# Patient Record
Sex: FEMALE | Race: WHITE | Employment: UNEMPLOYED | URBAN - METROPOLITAN AREA
[De-identification: names, ages, dates, MRNs, and addresses within clinical notes are randomized per-mention and may not be internally consistent; named-entity substitution may affect disease eponyms.]

---

## 2017-02-03 ENCOUNTER — TELEPHONE (OUTPATIENT)
Dept: FAMILY MEDICINE CLINIC | Facility: CLINIC | Age: 52
End: 2017-02-03

## 2017-02-03 RX ORDER — DIAZEPAM 5 MG/1
5 TABLET ORAL EVERY MORNING
Qty: 30 TABLET | Refills: 6 | Status: SHIPPED
Start: 2017-02-03 | End: 2017-02-08 | Stop reason: ALTCHOICE

## 2017-02-08 NOTE — TELEPHONE ENCOUNTER
Called patient and spoke with her. She states that given her recent family incident she is requesting to switch her Valium back to Xanax ER. Patient states that she is using Valium 5mg during the day and Valium 10mg at night for sleep.   She states that t

## 2017-02-08 NOTE — TELEPHONE ENCOUNTER
Rx called into pharmacy for patient. Called patient and left message advising of this information. Advised patient to contact the office with any questions.

## 2017-02-22 RX ORDER — DIAZEPAM 10 MG/1
10 TABLET ORAL NIGHTLY PRN
Qty: 30 TABLET | Refills: 1 | Status: SHIPPED
Start: 2017-02-22 | End: 2017-04-17

## 2017-04-17 ENCOUNTER — OFFICE VISIT (OUTPATIENT)
Dept: FAMILY MEDICINE CLINIC | Facility: CLINIC | Age: 52
End: 2017-04-17

## 2017-04-17 VITALS
TEMPERATURE: 98 F | SYSTOLIC BLOOD PRESSURE: 118 MMHG | WEIGHT: 136 LBS | DIASTOLIC BLOOD PRESSURE: 72 MMHG | HEART RATE: 96 BPM | RESPIRATION RATE: 16 BRPM | BODY MASS INDEX: 27 KG/M2

## 2017-04-17 DIAGNOSIS — Z13.228 SCREENING FOR ENDOCRINE, NUTRITIONAL, METABOLIC AND IMMUNITY DISORDER: Primary | ICD-10-CM

## 2017-04-17 DIAGNOSIS — Z12.11 SCREEN FOR COLON CANCER: ICD-10-CM

## 2017-04-17 DIAGNOSIS — Z13.0 SCREENING FOR ENDOCRINE, NUTRITIONAL, METABOLIC AND IMMUNITY DISORDER: Primary | ICD-10-CM

## 2017-04-17 DIAGNOSIS — F41.9 ANXIETY: ICD-10-CM

## 2017-04-17 DIAGNOSIS — Z13.29 SCREENING FOR ENDOCRINE, NUTRITIONAL, METABOLIC AND IMMUNITY DISORDER: Primary | ICD-10-CM

## 2017-04-17 DIAGNOSIS — R10.32 LLQ ABDOMINAL PAIN: ICD-10-CM

## 2017-04-17 DIAGNOSIS — Z12.31 ENCOUNTER FOR SCREENING MAMMOGRAM FOR MALIGNANT NEOPLASM OF BREAST: ICD-10-CM

## 2017-04-17 DIAGNOSIS — F32.0 MILD SINGLE CURRENT EPISODE OF MAJOR DEPRESSIVE DISORDER (HCC): ICD-10-CM

## 2017-04-17 DIAGNOSIS — Z13.21 SCREENING FOR ENDOCRINE, NUTRITIONAL, METABOLIC AND IMMUNITY DISORDER: Primary | ICD-10-CM

## 2017-04-17 PROCEDURE — 99214 OFFICE O/P EST MOD 30 MIN: CPT | Performed by: FAMILY MEDICINE

## 2017-04-17 RX ORDER — DIAZEPAM 10 MG/1
10 TABLET ORAL NIGHTLY PRN
Qty: 30 TABLET | Refills: 1 | Status: SHIPPED
Start: 2017-04-17 | End: 2017-05-15

## 2017-04-17 RX ORDER — ESOMEPRAZOLE MAGNESIUM 20 MG/1
20 FOR SUSPENSION ORAL
Qty: 30 EACH | Refills: 0 | COMMUNITY
Start: 2017-04-17 | End: 2017-10-10

## 2017-04-17 RX ORDER — ESOMEPRAZOLE MAGNESIUM 40 MG/1
40 FOR SUSPENSION ORAL
Qty: 12 EACH | Refills: 0 | Status: CANCELLED | COMMUNITY
Start: 2017-04-17

## 2017-04-17 NOTE — PROGRESS NOTES
Dwayne Coyne is a 45year old female. Patient presents with:  Medication Follow-Up      HPI:   Pt here to with a new complain of depressed mood.  Pt feels down, no energy, sensee of failure, crying, loss of interest. This problem last one month, pt has Used                        Alcohol Use: Yes                Comment: OCC       REVIEW OF SYSTEMS:   GENERAL HEALTH: feels well otherwise  RESPIRATORY: denies shortness of breath with exertion  CARDIOVASCULAR: denies chest pain on exertion  GI: denies abdom this visit:    Screening for endocrine, nutritional, metabolic and immunity disorder  -     CBC With Diff; Future  -     CMP; Future  -     Lipid;  Future  -     TSH W Reflex To Free T4; Future    Encounter for screening mammogram for malignant neoplasm of

## 2017-04-21 ENCOUNTER — HOSPITAL ENCOUNTER (OUTPATIENT)
Dept: CT IMAGING | Age: 52
Discharge: HOME | End: 2017-04-21
Attending: FAMILY MEDICINE
Payer: COMMERCIAL

## 2017-04-21 DIAGNOSIS — R10.32 LLQ ABDOMINAL PAIN: ICD-10-CM

## 2017-04-21 PROCEDURE — 74177 CT ABD & PELVIS W/CONTRAST: CPT

## 2017-04-24 ENCOUNTER — TELEPHONE (OUTPATIENT)
Dept: FAMILY MEDICINE CLINIC | Facility: CLINIC | Age: 52
End: 2017-04-24

## 2017-04-24 DIAGNOSIS — N93.9 VAGINAL BLEEDING: ICD-10-CM

## 2017-04-24 DIAGNOSIS — N95.1 MENOPAUSE SYNDROME: Primary | ICD-10-CM

## 2017-04-24 RX ORDER — METRONIDAZOLE 500 MG/1
500 TABLET ORAL 3 TIMES DAILY
Qty: 21 TABLET | Refills: 0 | Status: SHIPPED | OUTPATIENT
Start: 2017-04-24 | End: 2017-05-01

## 2017-04-24 RX ORDER — CIPROFLOXACIN 500 MG/1
500 TABLET, FILM COATED ORAL 2 TIMES DAILY
Qty: 14 TABLET | Refills: 0 | Status: SHIPPED | OUTPATIENT
Start: 2017-04-24 | End: 2017-05-01

## 2017-04-24 NOTE — TELEPHONE ENCOUNTER
Pt is calling back with questions. Pt states the medications that were ordered were not discussed with her. Pls call.

## 2017-04-24 NOTE — TELEPHONE ENCOUNTER
I spoke to pt. She has been having pain over her right ovary, bloating and pressure. Pt states she has bleeding when she urinates.   When I asked if its possible that its vaginal bleeding, pt states she doesn't know, just states she sees blood when she wi

## 2017-04-24 NOTE — TELEPHONE ENCOUNTER
Pt states she now feels this is not from a UTI, its vaginally. She states she has vaginal bleeding after sex often. Pt refuses to come in for urine check. Discussed that we don't want to miss a UTI, plus she has kidney stones per report.   Pt declines OV

## 2017-04-24 NOTE — TELEPHONE ENCOUNTER
Pt needs to be seen for bleeding during urination as patient did not discuss this with Dr. Kirill Baker that I can see from her note. I am going to treat her for colitis with cipro and flagyl. Please have her f/u with Dr. Kirill Baker or I if no improvement after antibiotic.

## 2017-04-24 NOTE — TELEPHONE ENCOUNTER
Ovarian cysts usually do not cause vaginal bleeding. That is usually more of a uterine problem like fibroids, thickened lining, etc.. This is not explained by the CT findings. We need to do pelvic US to assess this.  Dr. Juan Pablo Guadalupe will likely do an endometria

## 2017-04-24 NOTE — TELEPHONE ENCOUNTER
----- Message from Frankie Linares [place] sent at 4/24/2017 12:24 PM CDT -----  Patient has mild transverse colon wall thickening which could be normal or d/t colitis. I did not see this patient. Please ask her symptoms for which we did the CT and let me know.

## 2017-04-26 ENCOUNTER — HOSPITAL ENCOUNTER (OUTPATIENT)
Dept: ULTRASOUND IMAGING | Age: 52
Discharge: HOME | End: 2017-04-26
Attending: PHYSICIAN ASSISTANT
Payer: COMMERCIAL

## 2017-04-26 ENCOUNTER — LAB ENCOUNTER (OUTPATIENT)
Dept: LAB | Age: 52
End: 2017-04-26
Attending: FAMILY MEDICINE
Payer: COMMERCIAL

## 2017-04-26 DIAGNOSIS — Z13.228 SCREENING FOR ENDOCRINE, NUTRITIONAL, METABOLIC AND IMMUNITY DISORDER: ICD-10-CM

## 2017-04-26 DIAGNOSIS — N95.1 MENOPAUSE SYNDROME: ICD-10-CM

## 2017-04-26 DIAGNOSIS — Z13.21 SCREENING FOR ENDOCRINE, NUTRITIONAL, METABOLIC AND IMMUNITY DISORDER: ICD-10-CM

## 2017-04-26 DIAGNOSIS — N93.9 VAGINAL BLEEDING: ICD-10-CM

## 2017-04-26 DIAGNOSIS — Z13.29 SCREENING FOR ENDOCRINE, NUTRITIONAL, METABOLIC AND IMMUNITY DISORDER: ICD-10-CM

## 2017-04-26 DIAGNOSIS — Z13.0 SCREENING FOR ENDOCRINE, NUTRITIONAL, METABOLIC AND IMMUNITY DISORDER: ICD-10-CM

## 2017-04-26 PROCEDURE — 80061 LIPID PANEL: CPT | Performed by: FAMILY MEDICINE

## 2017-04-26 PROCEDURE — 76856 US EXAM PELVIC COMPLETE: CPT

## 2017-04-26 PROCEDURE — 76830 TRANSVAGINAL US NON-OB: CPT

## 2017-04-26 PROCEDURE — 36415 COLL VENOUS BLD VENIPUNCTURE: CPT | Performed by: FAMILY MEDICINE

## 2017-04-26 PROCEDURE — 80050 GENERAL HEALTH PANEL: CPT | Performed by: FAMILY MEDICINE

## 2017-04-27 ENCOUNTER — TELEPHONE (OUTPATIENT)
Dept: FAMILY MEDICINE CLINIC | Facility: CLINIC | Age: 52
End: 2017-04-27

## 2017-04-27 NOTE — TELEPHONE ENCOUNTER
----- Message from Fatuma Bahena, [place] sent at 4/26/2017  7:48 PM CDT -----  Patient's endometrium is 9 mm which is normal if she is still menstruating but is a little thick if she is not.  If she is experiencing abnormal vaginal bleeding, then Dr. Tatiana Tam w

## 2017-04-27 NOTE — TELEPHONE ENCOUNTER
Called patient and spoke with her. Went over results and POC below. Patient states understanding. All questions answered for patient and she expressed understanding.

## 2017-04-28 ENCOUNTER — TELEPHONE (OUTPATIENT)
Dept: FAMILY MEDICINE CLINIC | Facility: CLINIC | Age: 52
End: 2017-04-28

## 2017-04-28 NOTE — TELEPHONE ENCOUNTER
Patient calling again told her someone will get back to her and she questioned since when can they leave detailed messages. The response since you authorized on your HIPPA form.

## 2017-04-28 NOTE — TELEPHONE ENCOUNTER
----- Message from Anshu Pugh MD sent at 4/27/2017  8:54 PM CDT -----  Overall good results, cont. Low lipid diet.

## 2017-05-15 NOTE — PROGRESS NOTES
Zahraa Kelly is a 45year old female. Patient presents with:  Depression: f/u       HPI:   Pt  F/u anxiety, feeling much better, no more panic attacks, mood under control, feels well,last months no heart palpitations. Mild now. No chest pains.   Patient hallucinations, no SI, no HI. Normal insight/judjment. ASSESSMENT AND PLAN:   Anxiety - no suicidal or homicidal thoughts. panic attacks. Long discussion about pathophysiology of anxiety.     Signed Prescriptions Disp Refills    diazepam (VALIUM) 10 MG

## 2017-05-18 ENCOUNTER — TELEPHONE (OUTPATIENT)
Dept: FAMILY MEDICINE CLINIC | Facility: CLINIC | Age: 52
End: 2017-05-18

## 2017-05-18 NOTE — TELEPHONE ENCOUNTER
Pt had called to give phone # to contact [employer].. Pt informed that PA form completed/faxe to [employer]. and confirmation has been recv.

## 2017-05-18 NOTE — TELEPHONE ENCOUNTER
PA form completed and faxed to [facility]. Confirmation received. Will pierre for f/u. I called patient and advised that PA was initiated. Advised sometimes this process can take up to 14 days depending on quickly her insurance will process the PA.  She

## 2017-05-22 RX ORDER — ESCITALOPRAM OXALATE 10 MG/1
10 TABLET ORAL DAILY
Qty: 30 TABLET | Refills: 3 | Status: SHIPPED | OUTPATIENT
Start: 2017-05-22 | End: 2017-05-23

## 2017-05-22 NOTE — TELEPHONE ENCOUNTER
Pt was advised by [employer] that they will not cover this medication. Pt would like Dr to prescribe something in the same family but must be generic brand. Pt is also concerned with withdrawal from Vortioxetine HBr (TRINTELLIX). Please call and advise.  She has

## 2017-05-22 NOTE — TELEPHONE ENCOUNTER
PA was faxed to the insurance already. The pt stated the insurance is not covering the medication. Pt requires a generic. Please advise. Our office currently does not have samples.

## 2017-05-23 RX ORDER — BUPROPION HYDROCHLORIDE 300 MG/1
300 TABLET ORAL DAILY
Qty: 30 TABLET | Refills: 0 | Status: SHIPPED | OUTPATIENT
Start: 2017-05-23 | End: 2017-06-23

## 2017-05-23 RX ORDER — BUPROPION HYDROCHLORIDE 150 MG/1
150 TABLET ORAL DAILY
Qty: 7 TABLET | Refills: 0 | Status: SHIPPED | OUTPATIENT
Start: 2017-05-23 | End: 2017-06-23

## 2017-05-23 NOTE — TELEPHONE ENCOUNTER
Ok for Wellbutrin XL first week 150mg a day then 300mg XL once a day. Ok to call it in. Please call Mckenzie Parker from [employer] and tell her we have problems with refills.

## 2017-05-23 NOTE — TELEPHONE ENCOUNTER
Patient has cancelled script for lexapro, she had bad reaction to it previously, hair loss, wants script for wellbutrin sent, patient has been out of medication for 3 days now and is feeling dizzy, asks that this request be expedited

## 2017-05-23 NOTE — TELEPHONE ENCOUNTER
I sent in RXs and then called pt. Pt states she made a few more calls to the insurance company and the pharmacy entered the [employer] rebate card again and is covered at $10.  I cancelled the Bupropion with the pharmacy.

## 2017-06-22 NOTE — TELEPHONE ENCOUNTER
Please see above message. Called [facility] and spoke with Falguni Sanchez. He advised that patient refilled #30 tablets on 6/14/17. Please advise. Thank you!

## 2017-06-22 NOTE — TELEPHONE ENCOUNTER
Per Dr. Jorge Luis Vyas, ok for early refill, but patient needs to be reminded that this prescription will need to last for 30 days. I left a message for her to call back to inform her of above. Spoke to Pratik De La Cruz at [facility] and advised per Dr. Jorge Luis Vyas ok for early refill.  Br

## 2017-06-23 NOTE — TELEPHONE ENCOUNTER
I spoke to patient. Advised Valium sent to pharmacy. Advised no more refills until 7/22/17. She states undersanding. She is also requesting to d/c the Trintellix and go back to trying Wellbutrin.  Wellbutrin was previously sent to her pharmacy last shirin

## 2017-06-23 NOTE — TELEPHONE ENCOUNTER
Patient notified Wellbutrin sent to pharmacy. Reviewed medication directions. Med list updated. She states understanding. No further questions.

## 2017-07-17 ENCOUNTER — TELEPHONE (OUTPATIENT)
Dept: FAMILY MEDICINE CLINIC | Facility: CLINIC | Age: 52
End: 2017-07-17

## 2017-07-17 NOTE — TELEPHONE ENCOUNTER
Called pt, she stated the Bupropion is working well for her and that she would like a refill. Pt verified she is taking  mg daily. Okay to send additional refills? Also pt stated she has issues with IBS and she has been constipated for 2 weeks.  Pt

## 2017-07-18 RX ORDER — BUPROPION HYDROCHLORIDE 300 MG/1
300 TABLET ORAL DAILY
Qty: 30 TABLET | Refills: 5 | Status: SHIPPED | OUTPATIENT
Start: 2017-07-18 | End: 2017-08-10

## 2017-08-10 ENCOUNTER — TELEPHONE (OUTPATIENT)
Dept: FAMILY MEDICINE CLINIC | Facility: CLINIC | Age: 52
End: 2017-08-10

## 2017-08-10 RX ORDER — BUPROPION HYDROCHLORIDE 300 MG/1
300 TABLET ORAL DAILY
Qty: 90 TABLET | Refills: 0 | Status: SHIPPED | OUTPATIENT
Start: 2017-08-10 | End: 2017-11-08

## 2017-08-10 NOTE — TELEPHONE ENCOUNTER
Patient states per ins her refill for BuPROPion HCl ER, XL, 300 MG Oral Tablet 24 Hr has to be 90 day refill

## 2017-09-07 ENCOUNTER — TELEPHONE (OUTPATIENT)
Dept: FAMILY MEDICINE CLINIC | Facility: CLINIC | Age: 52
End: 2017-09-07

## 2017-09-07 NOTE — TELEPHONE ENCOUNTER
Patient states she got some labs done through her spouses employment and wants someone to go through her April numbers with her, she feels her numbers are way off, please call patient

## 2017-09-08 RX ORDER — ATORVASTATIN CALCIUM 20 MG/1
20 TABLET, FILM COATED ORAL NIGHTLY
Qty: 90 TABLET | Refills: 0 | Status: SHIPPED | OUTPATIENT
Start: 2017-09-08 | End: 2017-12-06

## 2017-09-08 NOTE — TELEPHONE ENCOUNTER
Pt calling regarding Lab Yanni labs done 17. I had Dr Ct Curtis review labs and she states her TC and LDL is high. Pts mom  at age 48 something of heart disease. Pt is very upset about her numbers.  States she is in shape and does not eat red meat

## 2017-10-09 DIAGNOSIS — F41.9 ANXIETY: ICD-10-CM

## 2017-10-09 RX ORDER — DIAZEPAM 10 MG/1
10 TABLET ORAL NIGHTLY PRN
Qty: 30 TABLET | Refills: 6 | Status: CANCELLED
Start: 2017-10-09

## 2017-10-10 ENCOUNTER — OFFICE VISIT (OUTPATIENT)
Dept: OBGYN CLINIC | Facility: CLINIC | Age: 52
End: 2017-10-10

## 2017-10-10 VITALS
BODY MASS INDEX: 25.34 KG/M2 | WEIGHT: 124 LBS | SYSTOLIC BLOOD PRESSURE: 102 MMHG | HEART RATE: 102 BPM | DIASTOLIC BLOOD PRESSURE: 68 MMHG | HEIGHT: 58.5 IN

## 2017-10-10 DIAGNOSIS — Z85.3 HISTORY OF BREAST CANCER: Primary | ICD-10-CM

## 2017-10-10 DIAGNOSIS — Z12.4 CERVICAL CANCER SCREENING: ICD-10-CM

## 2017-10-10 DIAGNOSIS — N93.0 PCB (POST COITAL BLEEDING): ICD-10-CM

## 2017-10-10 DIAGNOSIS — Z01.419 WELL WOMAN EXAM WITH ROUTINE GYNECOLOGICAL EXAM: ICD-10-CM

## 2017-10-10 PROCEDURE — 88175 CYTOPATH C/V AUTO FLUID REDO: CPT | Performed by: NURSE PRACTITIONER

## 2017-10-10 PROCEDURE — 87624 HPV HI-RISK TYP POOLED RSLT: CPT | Performed by: NURSE PRACTITIONER

## 2017-10-10 PROCEDURE — 99386 PREV VISIT NEW AGE 40-64: CPT | Performed by: NURSE PRACTITIONER

## 2017-10-10 NOTE — PROGRESS NOTES
Here for new gynecology visit. 47year old G 3 P 3. Patient's last menstrual period was 09/10/2017 (approximate). Here for Annual Gynecologic Exam. She is a breast cancer survivor, had bilateral mastectomy with reconstruction.  She opted no to do chemo Living   3 3 3 0 0 3   SAB TAB Ectopic Multiple Live Births   0 0 0 0 3      # Outcome Date GA Lbr Sandor/2nd Weight Sex Delivery Anes PTL Lv   3 Term 08/1999 37w0d   F NORMAL SPONT   DAYSI   2 Term 02/1993 38w0d   F NORMAL SPONT   DAYSI   1 Term 06/1985 40w0d

## 2017-10-11 RX ORDER — DIAZEPAM 10 MG/1
10 TABLET ORAL NIGHTLY PRN
Qty: 30 TABLET | Refills: 1 | Status: SHIPPED
Start: 2017-10-11 | End: 2018-01-04

## 2017-10-16 NOTE — PROGRESS NOTES
Patient notified. Verbalized understanding. She states she has too many bills due to breast cancer. She will repeat it at a year but not 6 months. Mile Chavez.

## 2017-11-08 NOTE — TELEPHONE ENCOUNTER
Pt requests refill of Bupropion XL 300mg, please [id number] [address] [place], [place] - [address] [address], [phone], [phone] or call Pt on Mobile if any questions.

## 2017-11-09 RX ORDER — BUPROPION HYDROCHLORIDE 300 MG/1
300 TABLET ORAL DAILY
Qty: 90 TABLET | Refills: 0 | Status: SHIPPED | OUTPATIENT
Start: 2017-11-09 | End: 2018-01-24

## 2017-12-06 RX ORDER — ATORVASTATIN CALCIUM 20 MG/1
TABLET, FILM COATED ORAL
Qty: 90 TABLET | Refills: 0 | Status: SHIPPED | OUTPATIENT
Start: 2017-12-06 | End: 2018-06-26 | Stop reason: DRUGHIGH

## 2018-01-04 DIAGNOSIS — F41.9 ANXIETY: ICD-10-CM

## 2018-01-04 RX ORDER — DIAZEPAM 10 MG/1
10 TABLET ORAL NIGHTLY PRN
Qty: 30 TABLET | Refills: 1 | Status: SHIPPED
Start: 2018-01-04 | End: 2018-02-26

## 2018-01-04 NOTE — TELEPHONE ENCOUNTER
Medication(s) to Refill:   Pending Prescriptions Disp Refills    diazepam (VALIUM) 10 MG Oral Tab 30 tablet 1     Sig: Take 1 tablet (10 mg total) by mouth nightly as needed.            Last Time Medication was Filled:  10/11/2017 # 30 w/1 rf    Last Office

## 2018-01-24 RX ORDER — BUPROPION HYDROCHLORIDE 300 MG/1
300 TABLET ORAL DAILY
Qty: 90 TABLET | Refills: 0 | Status: SHIPPED | OUTPATIENT
Start: 2018-01-24 | End: 2018-04-19

## 2018-01-24 NOTE — TELEPHONE ENCOUNTER
BuPROPion HCl ER, XL, 300 MG Oral Tablet 24 Hr       Patient is needing a refill on [facility]

## 2018-02-26 DIAGNOSIS — F41.9 ANXIETY: ICD-10-CM

## 2018-02-26 RX ORDER — DIAZEPAM 10 MG/1
10 TABLET ORAL NIGHTLY PRN
Qty: 30 TABLET | Refills: 1 | Status: SHIPPED
Start: 2018-02-26 | End: 2018-04-19

## 2018-04-19 DIAGNOSIS — F41.9 ANXIETY: ICD-10-CM

## 2018-04-19 RX ORDER — BUPROPION HYDROCHLORIDE 300 MG/1
300 TABLET ORAL DAILY
Qty: 90 TABLET | Refills: 0 | Status: SHIPPED | OUTPATIENT
Start: 2018-04-19 | End: 2018-06-28

## 2018-04-19 RX ORDER — DIAZEPAM 10 MG/1
10 TABLET ORAL NIGHTLY PRN
Qty: 30 TABLET | Refills: 3 | Status: SHIPPED
Start: 2018-04-19 | End: 2018-08-13

## 2018-04-19 NOTE — TELEPHONE ENCOUNTER
Medication(s) to Refill:   Pending Prescriptions Disp Refills    BuPROPion HCl ER, XL, 300 MG Oral Tablet 24 Hr 90 tablet 0     Sig: Take 1 tablet (300 mg total) by mouth daily.              Reason for Medication Refill being sent to Provider / Prem Das

## 2018-04-19 NOTE — TELEPHONE ENCOUNTER
diazepam (VALIUM) 10 MG Oral Tab Sig :  Take 1 tablet (10 mg total) by mouth nightly as needed.       Needing this refill sent to [facility] in [place]./

## 2018-06-21 ENCOUNTER — TELEPHONE (OUTPATIENT)
Dept: FAMILY MEDICINE CLINIC | Facility: CLINIC | Age: 53
End: 2018-06-21

## 2018-06-21 NOTE — TELEPHONE ENCOUNTER
Patient is faxing over some lab results that were taken thru her 's employer. Patient is concerned about her cholesterol, it came back at 262. Please advise.

## 2018-06-21 NOTE — TELEPHONE ENCOUNTER
Please see below message. I placed the lab results in your bin. Patient most concerned about her TC. Please advise, thanks.

## 2018-06-22 NOTE — TELEPHONE ENCOUNTER
Spoke to patient. She has been off of the atorvastatin for at least 6 months. If she needs to restart, requesting script be sent to [facility] on file. Do you want me to send new Rx?

## 2018-06-22 NOTE — TELEPHONE ENCOUNTER
LMTCB.  The number that we have reached pt at today (which is the only one on her snapshot) is not on the HIPAA consent. So I just LMTCB on Mon.  Didn't leave a detailed message.

## 2018-06-26 RX ORDER — ATORVASTATIN CALCIUM 10 MG/1
10 TABLET, FILM COATED ORAL NIGHTLY
Qty: 90 TABLET | Refills: 0 | Status: SHIPPED | OUTPATIENT
Start: 2018-06-26 | End: 2018-09-24

## 2018-06-28 RX ORDER — BUPROPION HYDROCHLORIDE 300 MG/1
300 TABLET ORAL DAILY
Qty: 90 TABLET | Refills: 0 | Status: SHIPPED | OUTPATIENT
Start: 2018-06-28 | End: 2018-09-24

## 2018-06-28 NOTE — TELEPHONE ENCOUNTER
LOV 5/15/17           LF 4/19/18 # 90 with 0 refills      Please approve or deny Rx request.  Thank you!

## 2018-07-02 ENCOUNTER — TELEPHONE (OUTPATIENT)
Dept: FAMILY MEDICINE CLINIC | Facility: CLINIC | Age: 53
End: 2018-07-02

## 2018-07-02 NOTE — TELEPHONE ENCOUNTER
[facility], [facility] states [facility] have [facility] Rx ready for  since 6/28. He states they do have 300mg tabs in now. Pt notified of this info. All questions answered, pt expresses understanding.

## 2018-07-02 NOTE — TELEPHONE ENCOUNTER
The patient requested a refill of BuPROPion HCl ER, XL, 300 MG Oral Tablet 24 Hr, she spoke with the pharmacy and they did not receive a response. Please look at TE from 6/28, the prescription was sent to [facility], please resend if possible.

## 2018-08-10 DIAGNOSIS — F41.9 ANXIETY: ICD-10-CM

## 2018-08-10 NOTE — TELEPHONE ENCOUNTER
Patient needs the following [facility]:  diazepam (VALIUM) 10 MG Oral Tab 30 tablet 3 4/19/2018    Sig :  Take 1 tablet (10 mg total) by mouth nightly as needed.      Route:   Oral

## 2018-08-11 RX ORDER — DIAZEPAM 10 MG/1
10 TABLET ORAL NIGHTLY PRN
Qty: 30 TABLET | Refills: 0
Start: 2018-08-11

## 2018-08-11 NOTE — TELEPHONE ENCOUNTER
Rx denied    PSR: Please contact pt to schedule (overdue) OV with PCP. Refills may be dependent on scheduling. Thank you.

## 2018-08-13 DIAGNOSIS — F41.9 ANXIETY: ICD-10-CM

## 2018-08-13 RX ORDER — DIAZEPAM 10 MG/1
10 TABLET ORAL NIGHTLY PRN
Qty: 30 TABLET | Refills: 0 | Status: SHIPPED
Start: 2018-08-13 | End: 2018-09-24

## 2018-08-13 NOTE — TELEPHONE ENCOUNTER
See TE 8/10/18 patient needs a refill of   diazepam (VALIUM) 10 MG Oral Tab 30 tablet 3 4/19/2018     Sig :  Take 1 tablet (10 mg total) by mouth nightly as needed.       Route:   Oral       She made an appointment for 9/17/18 and would like this refilled.

## 2018-09-24 ENCOUNTER — OFFICE VISIT (OUTPATIENT)
Dept: FAMILY MEDICINE CLINIC | Facility: CLINIC | Age: 53
End: 2018-09-24
Payer: COMMERCIAL

## 2018-09-24 VITALS
HEIGHT: 59 IN | WEIGHT: 132 LBS | HEART RATE: 82 BPM | TEMPERATURE: 99 F | OXYGEN SATURATION: 98 % | RESPIRATION RATE: 14 BRPM | BODY MASS INDEX: 26.61 KG/M2 | SYSTOLIC BLOOD PRESSURE: 114 MMHG | DIASTOLIC BLOOD PRESSURE: 68 MMHG

## 2018-09-24 DIAGNOSIS — F41.9 ANXIETY: ICD-10-CM

## 2018-09-24 DIAGNOSIS — G43.711 INTRACTABLE CHRONIC MIGRAINE WITHOUT AURA AND WITH STATUS MIGRAINOSUS: Primary | ICD-10-CM

## 2018-09-24 DIAGNOSIS — M79.7 FIBROMYALGIA: ICD-10-CM

## 2018-09-24 DIAGNOSIS — Z12.11 SCREENING FOR COLON CANCER: ICD-10-CM

## 2018-09-24 DIAGNOSIS — Z12.31 VISIT FOR SCREENING MAMMOGRAM: ICD-10-CM

## 2018-09-24 PROCEDURE — 99214 OFFICE O/P EST MOD 30 MIN: CPT | Performed by: FAMILY MEDICINE

## 2018-09-24 RX ORDER — ATORVASTATIN CALCIUM 10 MG/1
10 TABLET, FILM COATED ORAL NIGHTLY
Qty: 90 TABLET | Refills: 3 | Status: SHIPPED | OUTPATIENT
Start: 2018-09-24 | End: 2018-11-27

## 2018-09-24 RX ORDER — NORTRIPTYLINE HYDROCHLORIDE 25 MG/1
25 CAPSULE ORAL NIGHTLY
Qty: 30 CAPSULE | Refills: 6 | Status: SHIPPED | OUTPATIENT
Start: 2018-09-24 | End: 2018-11-27

## 2018-09-24 RX ORDER — DIAZEPAM 10 MG/1
10 TABLET ORAL NIGHTLY PRN
Qty: 30 TABLET | Refills: 5 | Status: SHIPPED
Start: 2018-09-24 | End: 2018-11-27

## 2018-09-24 RX ORDER — BUPROPION HYDROCHLORIDE 300 MG/1
300 TABLET ORAL DAILY
Qty: 90 TABLET | Refills: 3 | Status: SHIPPED | OUTPATIENT
Start: 2018-09-24 | End: 2018-11-27

## 2018-09-24 NOTE — PROGRESS NOTES
Patient presents with: Follow - Up: f/u meds and discuss fibromyalgia    HPI:  Patient complains of headaches. Also muscle pain, worsening, h/o fibromyalgia. Has had for: weeks. Description of pain: throbbing, dull, one side. No scotoma or aura. (Other[other]) Mother    • Other (mi[other]) Mother       Social History    Tobacco Use      Smoking status: Never Smoker      Smokeless tobacco: Never Used    Alcohol use: No    Drug use: No          ROS:  GEN: no weight loss, no fever, chills, fatigue, n MG Oral Cap; Take 1 capsule (25 mg total) by mouth nightly. Visit for screening mammogram  -     [facility] SCREENING BILAT (CPT=77067); Future        Neurologic exam is unremarkable. Diet, life style changes d/w the pt. Handout given.   Call if symptoms incr

## 2018-11-27 ENCOUNTER — HOSPITAL ENCOUNTER (EMERGENCY)
Age: 53
Discharge: HOME | End: 2018-11-27
Attending: EMERGENCY MEDICINE
Payer: COMMERCIAL

## 2018-11-27 ENCOUNTER — APPOINTMENT (OUTPATIENT)
Dept: GENERAL RADIOLOGY | Age: 53
End: 2018-11-27
Attending: EMERGENCY MEDICINE
Payer: COMMERCIAL

## 2018-11-27 VITALS
SYSTOLIC BLOOD PRESSURE: 88 MMHG | OXYGEN SATURATION: 98 % | TEMPERATURE: 100 F | BODY MASS INDEX: 21.6 KG/M2 | RESPIRATION RATE: 16 BRPM | DIASTOLIC BLOOD PRESSURE: 45 MMHG | HEIGHT: 60 IN | HEART RATE: 91 BPM | WEIGHT: 110 LBS

## 2018-11-27 DIAGNOSIS — R11.2 NAUSEA VOMITING AND DIARRHEA: ICD-10-CM

## 2018-11-27 DIAGNOSIS — E86.0 DEHYDRATION: Primary | ICD-10-CM

## 2018-11-27 DIAGNOSIS — J06.9 UPPER RESPIRATORY TRACT INFECTION, UNSPECIFIED TYPE: ICD-10-CM

## 2018-11-27 DIAGNOSIS — R19.7 NAUSEA VOMITING AND DIARRHEA: ICD-10-CM

## 2018-11-27 PROCEDURE — 81015 MICROSCOPIC EXAM OF URINE: CPT | Performed by: EMERGENCY MEDICINE

## 2018-11-27 PROCEDURE — 96361 HYDRATE IV INFUSION ADD-ON: CPT | Performed by: EMERGENCY MEDICINE

## 2018-11-27 PROCEDURE — 71045 X-RAY EXAM CHEST 1 VIEW: CPT | Performed by: EMERGENCY MEDICINE

## 2018-11-27 PROCEDURE — 87086 URINE CULTURE/COLONY COUNT: CPT | Performed by: EMERGENCY MEDICINE

## 2018-11-27 PROCEDURE — 81001 URINALYSIS AUTO W/SCOPE: CPT | Performed by: EMERGENCY MEDICINE

## 2018-11-27 PROCEDURE — 80053 COMPREHEN METABOLIC PANEL: CPT | Performed by: EMERGENCY MEDICINE

## 2018-11-27 PROCEDURE — 85025 COMPLETE CBC W/AUTO DIFF WBC: CPT | Performed by: EMERGENCY MEDICINE

## 2018-11-27 PROCEDURE — 99284 EMERGENCY DEPT VISIT MOD MDM: CPT | Performed by: EMERGENCY MEDICINE

## 2018-11-27 PROCEDURE — 96374 THER/PROPH/DIAG INJ IV PUSH: CPT | Performed by: EMERGENCY MEDICINE

## 2018-11-27 RX ORDER — ONDANSETRON 4 MG/1
4 TABLET, ORALLY DISINTEGRATING ORAL EVERY 4 HOURS PRN
Qty: 10 TABLET | Refills: 0 | Status: SHIPPED | OUTPATIENT
Start: 2018-11-27 | End: 2019-03-15

## 2018-11-27 RX ORDER — ONDANSETRON 2 MG/ML
4 INJECTION INTRAMUSCULAR; INTRAVENOUS ONCE
Status: COMPLETED | OUTPATIENT
Start: 2018-11-27 | End: 2018-11-27

## 2018-11-27 NOTE — ED PROVIDER NOTES
Patient Seen in: THE [facility] In [place]    History   Patient presents with:  Dyspnea AMINA SOB (respiratory)  Nausea/Vomiting/Diarrhea (gastrointestinal)    Stated Complaint: AMINA, VOMITING, DIARRHEA    HPI    Patient has had a cough, run (!) 135   Resp 18   Temp 100 °F (37.8 °C)   Temp src Oral   SpO2 98 %   O2 Device None (Room air)       Current:BP (!) 88/45   Pulse 91   Temp 100 °F (37.8 °C) (Oral)   Resp 16   Ht 152.4 cm (5')   Wt 49.9 kg   LMP 09/27/2018   SpO2 98%   BMI 21.48 kg/m² Present (*)     Granular Casts Present (*)     All other components within normal limits   CBC W/ DIFFERENTIAL - Abnormal; Notable for the following components:    RBC 3.37 (*)     HGB 9.9 (*)     HCT 29.6 (*)     Neutrophil Absolute Prelim 10.03 (*)     N Impression:  Dehydration  (primary encounter diagnosis)  Upper respiratory tract infection, unspecified type  Nausea vomiting and diarrhea    Disposition:  Discharge  11/27/2018 12:37 pm    Follow-up:  MD Eliza Clarke

## 2018-11-28 ENCOUNTER — TELEPHONE (OUTPATIENT)
Dept: FAMILY MEDICINE CLINIC | Facility: CLINIC | Age: 53
End: 2018-11-28

## 2018-11-28 NOTE — TELEPHONE ENCOUNTER
Patient called back and at this moment she doesn't want to come in, she doesn't feel strong enough and will call back for an f/up.

## 2018-11-28 NOTE — TELEPHONE ENCOUNTER
Left generic message on patients cell to call us back no name was on message. This is for ER follow up with Dr Rohini Acuña for vomiting,diarrhea,AMINA.

## 2018-11-29 ENCOUNTER — TELEPHONE (OUTPATIENT)
Dept: FAMILY MEDICINE CLINIC | Facility: CLINIC | Age: 53
End: 2018-11-29

## 2018-11-29 DIAGNOSIS — R76.8 LOW IMMUNOGLOBULIN LEVEL: Primary | ICD-10-CM

## 2018-11-29 NOTE — TELEPHONE ENCOUNTER
Patient called with concerns of her health the past month or so. She has not been feeling well-possible upper respiratory symptoms but patient not very clear on that.  Shortly before Thanksgiving she was at the grocery store and suddenly became very weak an

## 2018-11-29 NOTE — TELEPHONE ENCOUNTER
Patient left a voice mail message. She would like to speak with a nurse. She went to ER yesterday and is still not feeling good. She is unable to come in today and would appreciate a call back from a nurse.

## 2018-11-30 ENCOUNTER — OFFICE VISIT (OUTPATIENT)
Dept: FAMILY MEDICINE CLINIC | Facility: CLINIC | Age: 53
End: 2018-11-30
Payer: COMMERCIAL

## 2018-11-30 ENCOUNTER — LAB ENCOUNTER (OUTPATIENT)
Dept: LAB | Age: 53
End: 2018-11-30
Attending: FAMILY MEDICINE
Payer: COMMERCIAL

## 2018-11-30 VITALS
WEIGHT: 116 LBS | DIASTOLIC BLOOD PRESSURE: 70 MMHG | TEMPERATURE: 99 F | SYSTOLIC BLOOD PRESSURE: 116 MMHG | BODY MASS INDEX: 23.39 KG/M2 | HEART RATE: 66 BPM | RESPIRATION RATE: 18 BRPM | HEIGHT: 59 IN | OXYGEN SATURATION: 98 %

## 2018-11-30 DIAGNOSIS — R74.8 ABNORMAL LIVER ENZYMES: ICD-10-CM

## 2018-11-30 DIAGNOSIS — M79.10 MUSCLE ACHE: ICD-10-CM

## 2018-11-30 DIAGNOSIS — J20.8 ACUTE VIRAL BRONCHITIS: ICD-10-CM

## 2018-11-30 DIAGNOSIS — M79.10 MUSCLE ACHE: Primary | ICD-10-CM

## 2018-11-30 DIAGNOSIS — R63.4 WEIGHT LOSS: ICD-10-CM

## 2018-11-30 DIAGNOSIS — R76.8 LOW IMMUNOGLOBULIN LEVEL: ICD-10-CM

## 2018-11-30 PROCEDURE — 84080 ASSAY ALKALINE PHOSPHATASES: CPT | Performed by: FAMILY MEDICINE

## 2018-11-30 PROCEDURE — 85027 COMPLETE CBC AUTOMATED: CPT | Performed by: FAMILY MEDICINE

## 2018-11-30 PROCEDURE — 82378 CARCINOEMBRYONIC ANTIGEN: CPT | Performed by: FAMILY MEDICINE

## 2018-11-30 PROCEDURE — 86301 IMMUNOASSAY TUMOR CA 19-9: CPT | Performed by: FAMILY MEDICINE

## 2018-11-30 PROCEDURE — 83690 ASSAY OF LIPASE: CPT | Performed by: FAMILY MEDICINE

## 2018-11-30 PROCEDURE — 82784 ASSAY IGA/IGD/IGG/IGM EACH: CPT | Performed by: FAMILY MEDICINE

## 2018-11-30 PROCEDURE — 82105 ALPHA-FETOPROTEIN SERUM: CPT | Performed by: FAMILY MEDICINE

## 2018-11-30 PROCEDURE — 82306 VITAMIN D 25 HYDROXY: CPT | Performed by: FAMILY MEDICINE

## 2018-11-30 PROCEDURE — 83615 LACTATE (LD) (LDH) ENZYME: CPT | Performed by: FAMILY MEDICINE

## 2018-11-30 PROCEDURE — 80053 COMPREHEN METABOLIC PANEL: CPT | Performed by: FAMILY MEDICINE

## 2018-11-30 PROCEDURE — 36415 COLL VENOUS BLD VENIPUNCTURE: CPT | Performed by: FAMILY MEDICINE

## 2018-11-30 PROCEDURE — 85007 BL SMEAR W/DIFF WBC COUNT: CPT | Performed by: FAMILY MEDICINE

## 2018-11-30 PROCEDURE — 99214 OFFICE O/P EST MOD 30 MIN: CPT | Performed by: FAMILY MEDICINE

## 2018-11-30 PROCEDURE — 82607 VITAMIN B-12: CPT | Performed by: FAMILY MEDICINE

## 2018-11-30 RX ORDER — DEXTROMETHORPHAN HYDROBROMIDE AND PROMETHAZINE HYDROCHLORIDE 15; 6.25 MG/5ML; MG/5ML
5 SYRUP ORAL 4 TIMES DAILY PRN
Qty: 120 ML | Refills: 0 | Status: SHIPPED | OUTPATIENT
Start: 2018-11-30 | End: 2018-12-07 | Stop reason: ALTCHOICE

## 2018-11-30 RX ORDER — PREDNISONE 20 MG/1
40 TABLET ORAL DAILY
Qty: 10 TABLET | Refills: 0 | Status: SHIPPED | OUTPATIENT
Start: 2018-11-30 | End: 2018-12-07 | Stop reason: ALTCHOICE

## 2018-11-30 NOTE — PATIENT INSTRUCTIONS
Anxiety Reaction  Anxiety is the feeling we all get when we think something bad might happen. It is a normal response to stress and usually causes only a mild reaction. When anxiety becomes more severe, it can interfere with daily life.  In some cases, yo · Unfortunately, many stressful situations can't be avoided. It is necessary to learn how to better manage stress. There are many proven methods that will reduce your anxiety.  These include simple things like exercise, good nutrition, and adequate rest. Al There's a lot more to this food group than just meat and beans. It also includes nuts, seeds, and eggs.  There are all sorts of nutrient-rich choices:  · Chicken and turkey with the skin removed  · Fish and shellfish  · Lean beef, pork, or lamb (without vis

## 2018-11-30 NOTE — PROGRESS NOTES
Patient presents with:  ER F/U: [facility] f/u Vommitting /Diarrhea 11/27/18      HPI:   Emilio Ram is a 46year old female who presents for cough  for  2  weeks. Patient reports congestion, dry cough, cough is keeping pt up at night. Cough st SYSTEMS:   GENERAL:some chills. SKIN: no rashes, no hives. EYES:denies blurred vision or double vision,   HEENT: no earache, sore throat, mild sinus congestion. CHEST: no chest pains, no palpitations, no orthopnea.   LUNGS: denies shortness of breath wit

## 2018-12-03 ENCOUNTER — TELEPHONE (OUTPATIENT)
Dept: FAMILY MEDICINE CLINIC | Facility: CLINIC | Age: 53
End: 2018-12-03

## 2018-12-03 DIAGNOSIS — R74.8 ABNORMAL LIVER ENZYMES: Primary | ICD-10-CM

## 2018-12-03 DIAGNOSIS — R63.4 WEIGHT LOSS: ICD-10-CM

## 2018-12-03 NOTE — TELEPHONE ENCOUNTER
----- Message from Nano Marie MD sent at 12/2/2018  6:00 PM CST -----  Pt has very abnormal labs.  It may indicate colon cancer, but if she takes Biotin it can be false positive,  I would like pt to do CT abdomen and pelvis with PO and IV contrast. We

## 2018-12-03 NOTE — TELEPHONE ENCOUNTER
Message left for pt to call office back. OK to [facility] Triage.    Jesika Carrillo MD   Gastroenterology   [address]   Phone: [phone]   Fax: [phone]

## 2018-12-05 NOTE — TELEPHONE ENCOUNTER
Message left for pt to call office back. Please lync Triage when pt calls. I called # of Stephanie Garrido form also, that # is not in service.

## 2018-12-05 NOTE — TELEPHONE ENCOUNTER
Lab results & below orders discussed with pt. Pt was irritated & did not want to see GI for Colonoscopy or CT because Maik is coming. I did let pt know that further testing is necessary, as there is a concern for Colon cancer.   Pt agreed to do CT, b

## 2018-12-07 ENCOUNTER — APPOINTMENT (OUTPATIENT)
Dept: LAB | Age: 53
End: 2018-12-07
Attending: FAMILY MEDICINE
Payer: COMMERCIAL

## 2018-12-07 ENCOUNTER — OFFICE VISIT (OUTPATIENT)
Dept: FAMILY MEDICINE CLINIC | Facility: CLINIC | Age: 53
End: 2018-12-07
Payer: COMMERCIAL

## 2018-12-07 VITALS
BODY MASS INDEX: 23.39 KG/M2 | DIASTOLIC BLOOD PRESSURE: 68 MMHG | HEIGHT: 59 IN | RESPIRATION RATE: 18 BRPM | HEART RATE: 68 BPM | TEMPERATURE: 99 F | OXYGEN SATURATION: 99 % | SYSTOLIC BLOOD PRESSURE: 114 MMHG | WEIGHT: 116 LBS

## 2018-12-07 DIAGNOSIS — R76.8 LOW IMMUNOGLOBULIN LEVEL: ICD-10-CM

## 2018-12-07 DIAGNOSIS — C50.919 METASTATIC BREAST CANCER (HCC): Primary | ICD-10-CM

## 2018-12-07 PROCEDURE — 81001 URINALYSIS AUTO W/SCOPE: CPT | Performed by: FAMILY MEDICINE

## 2018-12-07 PROCEDURE — 99214 OFFICE O/P EST MOD 30 MIN: CPT | Performed by: FAMILY MEDICINE

## 2018-12-10 ENCOUNTER — TELEPHONE (OUTPATIENT)
Dept: FAMILY MEDICINE CLINIC | Facility: CLINIC | Age: 53
End: 2018-12-10

## 2018-12-10 ENCOUNTER — HOSPITAL ENCOUNTER (OUTPATIENT)
Dept: CT IMAGING | Age: 53
Discharge: HOME | End: 2018-12-10
Attending: FAMILY MEDICINE
Payer: COMMERCIAL

## 2018-12-10 DIAGNOSIS — R74.8 ABNORMAL LIVER ENZYMES: ICD-10-CM

## 2018-12-10 DIAGNOSIS — R82.90 ABNORMAL URINE FINDING: Primary | ICD-10-CM

## 2018-12-10 DIAGNOSIS — R63.4 WEIGHT LOSS: ICD-10-CM

## 2018-12-10 PROCEDURE — 74177 CT ABD & PELVIS W/CONTRAST: CPT | Performed by: FAMILY MEDICINE

## 2018-12-10 NOTE — TELEPHONE ENCOUNTER
PSR please [facility] triage. VM left for patient to call back. Order placed. Patient has CT scheduled for this evening.

## 2018-12-10 NOTE — TELEPHONE ENCOUNTER
Patient aware of abnormal UA. I advised her to have urine culture done when she goes in for CT this evening. Verbalized understanding.

## 2018-12-10 NOTE — TELEPHONE ENCOUNTER
----- Message from Minh Forde MD sent at 12/10/2018  9:08 AM CST -----  We need a culture. CT today.

## 2018-12-11 ENCOUNTER — TELEPHONE (OUTPATIENT)
Dept: FAMILY MEDICINE CLINIC | Facility: CLINIC | Age: 53
End: 2018-12-11

## 2018-12-11 PROBLEM — C50.919 METASTATIC BREAST CANCER: Status: ACTIVE | Noted: 2018-12-11

## 2018-12-11 PROBLEM — C50.919 METASTATIC BREAST CANCER (HCC): Status: ACTIVE | Noted: 2018-12-11

## 2018-12-11 NOTE — TELEPHONE ENCOUNTER
Pt scheduled for OV with Dr. Lopez [facility].    Future Appointments   Date Time Provider [facility]   12/14/2018 12:00 PM Radha Ruelas MD [facility]

## 2018-12-11 NOTE — TELEPHONE ENCOUNTER
Pt called asking if she needs to be taking any Vitamin D supplants.  Since her Vitamin D is low  Please let Pt know

## 2018-12-11 NOTE — TELEPHONE ENCOUNTER
Started to inform patient who stated she wanted to speak to Dr. Elvin Barajas herself. Dr. María Marcelo asked to call patient back.

## 2018-12-11 NOTE — TELEPHONE ENCOUNTER
----- Message from Verito Gerardo MD sent at 12/11/2018 10:35 AM CST -----  Looks like metastatic disease, we do not know the source, I've ordered Pet scan and I recommend colonoscopy with Dr. Caden Woodward, I can ask Dr. Caden Woodward to see her asap.   Also I wou

## 2018-12-11 NOTE — PROGRESS NOTES
Patient presents with:  Lab Results        HPI:  The patient has abnormal cancer markers in the blood, h/o breast ca. Pt has mastectomy. Abnormal colon and pancreatic cancer level. No current outpatient medications on file.    Past Medical Histor 99%   BMI 23.43 kg/m²   GEN: NAD, A,O x3, pleasant. HEENT: PEERLA, EOM-i, normal oral mucosa, TM wnl cole. NO teeth clenching  NECK: supple, no lymphadenopathy. LUNGS: CTA cole. HEART:S1/S2 reg., no murmurs, clicks, gallops.   ABD: soft, BS present, NT,ND,

## 2018-12-11 NOTE — TELEPHONE ENCOUNTER
Pt would like to know if she should take vitamin D supplement for low vitamin D.   Pt's vitamin D was 45.2 as of 11/3018. Please advise.  Thank you

## 2018-12-14 ENCOUNTER — APPOINTMENT (OUTPATIENT)
Dept: LAB | Age: 53
End: 2018-12-14
Attending: FAMILY MEDICINE
Payer: COMMERCIAL

## 2018-12-14 ENCOUNTER — APPOINTMENT (OUTPATIENT)
Dept: HEMATOLOGY/ONCOLOGY | Facility: HOSPITAL | Age: 53
End: 2018-12-14
Attending: INTERNAL MEDICINE
Payer: COMMERCIAL

## 2018-12-14 DIAGNOSIS — R82.90 ABNORMAL URINE FINDING: ICD-10-CM

## 2018-12-14 PROCEDURE — 87088 URINE BACTERIA CULTURE: CPT | Performed by: FAMILY MEDICINE

## 2018-12-14 PROCEDURE — 87086 URINE CULTURE/COLONY COUNT: CPT | Performed by: FAMILY MEDICINE

## 2018-12-14 PROCEDURE — 87186 SC STD MICRODIL/AGAR DIL: CPT | Performed by: FAMILY MEDICINE

## 2018-12-17 ENCOUNTER — TELEPHONE (OUTPATIENT)
Dept: FAMILY MEDICINE CLINIC | Facility: CLINIC | Age: 53
End: 2018-12-17

## 2018-12-17 DIAGNOSIS — N39.0 URINARY TRACT INFECTION WITH HEMATURIA, SITE UNSPECIFIED: ICD-10-CM

## 2018-12-17 DIAGNOSIS — N39.0 URINARY TRACT INFECTION WITHOUT HEMATURIA, SITE UNSPECIFIED: Primary | ICD-10-CM

## 2018-12-17 DIAGNOSIS — R31.9 URINARY TRACT INFECTION WITH HEMATURIA, SITE UNSPECIFIED: ICD-10-CM

## 2018-12-17 RX ORDER — CIPROFLOXACIN 500 MG/1
500 TABLET, FILM COATED ORAL 2 TIMES DAILY
Qty: 10 TABLET | Refills: 0 | Status: SHIPPED | OUTPATIENT
Start: 2018-12-17 | End: 2019-01-02

## 2018-12-17 NOTE — TELEPHONE ENCOUNTER
Pt notified of Culture results & below orders. Rx sent to [facility] per pt's request. Pt notified to call Dr. David Thomas office to schedule appt ASAP, that PET scan was denied by pt's insurance. All questions answered, pt expresses understanding.

## 2018-12-17 NOTE — TELEPHONE ENCOUNTER
Per AIM online CPT code 19035 did not meet medical necessity: Referred to Health Plan for Further Action. Physician can call [phone] & follow the prompts to complete a peer to peer. Please contact the pt & advise of the current status of this ref.

## 2018-12-17 NOTE — TELEPHONE ENCOUNTER
Pre termination for patient to be able to get PET scan done. Asking for H & P in order to get PET scan approved   Pls call Jesus Antony with questions. Pls fax asap to [phone].

## 2018-12-17 NOTE — TELEPHONE ENCOUNTER
Message received from referral dept that PET scan was denied by pt's insurance. Please advise on alternate orders or if you would like a doctor to doctor phone call set up with pt's insurance.

## 2018-12-17 NOTE — TELEPHONE ENCOUNTER
PSR please lync triage    Notes recorded by Rekha Jackson MD on 12/16/2018 at 7:26 PM CST  Ask why pt canceled cancer counsultation visit?  ------    Notes recorded by Rekha Jackson MD on 12/16/2018 at 7:25 PM CST  Cipro 500mg BID for 5 days, recut

## 2018-12-17 NOTE — TELEPHONE ENCOUNTER
Pt needs to see Dr. Sindi Mccartney, she told me pt cacnel the visit, its very important, cancer center organizes the Pet scans.

## 2018-12-19 ENCOUNTER — TELEPHONE (OUTPATIENT)
Dept: HEMATOLOGY/ONCOLOGY | Facility: HOSPITAL | Age: 53
End: 2018-12-19

## 2018-12-19 NOTE — TELEPHONE ENCOUNTER
Returned patient's phone call. States she has been diagnosed with metastatic breast cancer. She had CT C/A/P. She is working with her primary care and insurance company to get a PET scan authorized.  She would like to establish with Dr. Ro Cortés following her

## 2018-12-20 ENCOUNTER — TELEPHONE (OUTPATIENT)
Dept: FAMILY MEDICINE CLINIC | Facility: CLINIC | Age: 53
End: 2018-12-20

## 2018-12-20 NOTE — TELEPHONE ENCOUNTER
Dexter Zarate with [employer] called regarding prior authorization for PET scan. BC received clinical info but in order for the authorization to be determined a Pre-determination form needs to be attached to the clinical info.  Dexter Zarate will be faxing the form t

## 2018-12-20 NOTE — TELEPHONE ENCOUNTER
A pre-determination form was just received from [employer]. I filled this out and faxed it back along with clinical information. Confirmation received.

## 2018-12-20 NOTE — TELEPHONE ENCOUNTER
VM left for patient. I explained that I have received the pre-determination form which I completed and faxed back with clinical information and this was marked urgent.  I did explain that the pre-determination was done by the referral department and is unus

## 2018-12-20 NOTE — TELEPHONE ENCOUNTER
Patient called very upset. She says the insurance company has not received the \"pre-determination\" and that is why they are denying the CT scan. I explained that on the 17th we faxed all requested clinical information.  She keeps insisting that we need to

## 2018-12-24 NOTE — TELEPHONE ENCOUNTER
Below message received from referral dept:    [facility] Clinical Staff             Good morning,     Please contact pt & advise apt for this ref on 12/26/18 will need to be canceled at this time since test has been denied.      Thank you,

## 2018-12-24 NOTE — TELEPHONE ENCOUNTER
I called [address] spoke with Radha to find out status of pre-determination that was faxed over on 12/17. He stated the Pet CT was authorized on 12/20. Auth # [id number]. Referral updated.   He was unable to fax over any auth info, he states they can on

## 2018-12-26 ENCOUNTER — HOSPITAL ENCOUNTER (OUTPATIENT)
Dept: NUCLEAR MEDICINE | Facility: HOSPITAL | Age: 53
Discharge: HOME | End: 2018-12-26
Attending: FAMILY MEDICINE
Payer: COMMERCIAL

## 2018-12-26 ENCOUNTER — TELEPHONE (OUTPATIENT)
Dept: FAMILY MEDICINE CLINIC | Facility: CLINIC | Age: 53
End: 2018-12-26

## 2018-12-26 DIAGNOSIS — C50.919 METASTATIC BREAST CANCER (HCC): ICD-10-CM

## 2018-12-26 DIAGNOSIS — C50.919 METASTATIC BREAST CANCER (HCC): Primary | ICD-10-CM

## 2018-12-26 PROCEDURE — 78815 PET IMAGE W/CT SKULL-THIGH: CPT | Performed by: FAMILY MEDICINE

## 2018-12-26 PROCEDURE — 82962 GLUCOSE BLOOD TEST: CPT

## 2018-12-26 NOTE — TELEPHONE ENCOUNTER
LM for patient to call back for result. Spoke with Dr. Yuliya Leon who stated patient needs to see DR. Nicko Frost for POC, however if she feels she would like to come in to discuss with DR. Wilburn she can do that first.

## 2018-12-26 NOTE — TELEPHONE ENCOUNTER
Pt notified of PET scan & below orders. Pt has appt with Dr. Raissa Bates on 1/2. Results forwarded to Dr. Raissa Bates. All questions answered, emotional support given to pt.  I stressed to pt to keep her appt with Dr. Raissa Bates so a plan of care can be started with h

## 2018-12-26 NOTE — TELEPHONE ENCOUNTER
----- Message from Lexii Benedict MD sent at 12/26/2018  2:07 PM CST -----  Pt has bone mets. Not sure whats is primary, most likely breast ca. Ok to see Dr. Grijalva Grandchild.

## 2018-12-27 ENCOUNTER — TELEPHONE (OUTPATIENT)
Dept: FAMILY MEDICINE CLINIC | Facility: CLINIC | Age: 53
End: 2018-12-27

## 2018-12-27 DIAGNOSIS — E87.6 LOW SERUM POTASSIUM: Primary | ICD-10-CM

## 2018-12-27 NOTE — TELEPHONE ENCOUNTER
Patient has questions about her health records about her diagnosis that she would like to talk to a nurse about

## 2018-12-27 NOTE — TELEPHONE ENCOUNTER
Pt states she is coming in tomorrow for her repeat urine culture,  11/30  K+ was 3.1, Do you want her to repeat Potassium lab also? Pt requesting a copy of her latest test results. Results placed at  for .

## 2019-01-01 ENCOUNTER — PATIENT MESSAGE (OUTPATIENT)
Dept: FAMILY MEDICINE CLINIC | Facility: CLINIC | Age: 54
End: 2019-01-01

## 2019-01-01 ENCOUNTER — TELEPHONE (OUTPATIENT)
Dept: HEMATOLOGY/ONCOLOGY | Facility: HOSPITAL | Age: 54
End: 2019-01-01

## 2019-01-01 ENCOUNTER — HOSPITAL ENCOUNTER (EMERGENCY)
Age: 54
Discharge: HOME | End: 2019-01-01
Attending: EMERGENCY MEDICINE
Payer: COMMERCIAL

## 2019-01-01 ENCOUNTER — TELEPHONE (OUTPATIENT)
Dept: FAMILY MEDICINE CLINIC | Facility: CLINIC | Age: 54
End: 2019-01-01

## 2019-01-01 ENCOUNTER — APPOINTMENT (OUTPATIENT)
Dept: HEMATOLOGY/ONCOLOGY | Age: 54
End: 2019-01-01
Attending: NURSE PRACTITIONER
Payer: COMMERCIAL

## 2019-01-01 ENCOUNTER — APPOINTMENT (OUTPATIENT)
Dept: GENERAL RADIOLOGY | Age: 54
End: 2019-01-01
Attending: EMERGENCY MEDICINE
Payer: COMMERCIAL

## 2019-01-01 ENCOUNTER — MED REC SCAN ONLY (OUTPATIENT)
Dept: FAMILY MEDICINE CLINIC | Facility: CLINIC | Age: 54
End: 2019-01-01

## 2019-01-01 ENCOUNTER — APPOINTMENT (OUTPATIENT)
Dept: HEMATOLOGY/ONCOLOGY | Age: 54
End: 2019-01-01
Attending: INTERNAL MEDICINE
Payer: COMMERCIAL

## 2019-01-01 ENCOUNTER — LAB ENCOUNTER (OUTPATIENT)
Dept: LAB | Age: 54
End: 2019-01-01
Attending: FAMILY MEDICINE
Payer: COMMERCIAL

## 2019-01-01 ENCOUNTER — OFFICE VISIT (OUTPATIENT)
Dept: FAMILY MEDICINE CLINIC | Facility: CLINIC | Age: 54
End: 2019-01-01
Payer: COMMERCIAL

## 2019-01-01 ENCOUNTER — HOSPITAL ENCOUNTER (OUTPATIENT)
Dept: CT IMAGING | Age: 54
Discharge: HOME | End: 2019-01-01
Attending: INTERNAL MEDICINE
Payer: COMMERCIAL

## 2019-01-01 VITALS
BODY MASS INDEX: 25.2 KG/M2 | OXYGEN SATURATION: 99 % | HEIGHT: 59 IN | RESPIRATION RATE: 20 BRPM | DIASTOLIC BLOOD PRESSURE: 80 MMHG | TEMPERATURE: 99 F | WEIGHT: 125 LBS | SYSTOLIC BLOOD PRESSURE: 132 MMHG | HEART RATE: 88 BPM

## 2019-01-01 VITALS
RESPIRATION RATE: 16 BRPM | HEIGHT: 60.24 IN | BODY MASS INDEX: 22.28 KG/M2 | SYSTOLIC BLOOD PRESSURE: 138 MMHG | DIASTOLIC BLOOD PRESSURE: 60 MMHG | WEIGHT: 115 LBS | HEART RATE: 68 BPM | OXYGEN SATURATION: 100 % | TEMPERATURE: 97 F

## 2019-01-01 DIAGNOSIS — D50.9 IRON DEFICIENCY ANEMIA, UNSPECIFIED IRON DEFICIENCY ANEMIA TYPE: Primary | ICD-10-CM

## 2019-01-01 DIAGNOSIS — F41.9 ANXIETY: ICD-10-CM

## 2019-01-01 DIAGNOSIS — C79.51 BONE METASTASES (HCC): ICD-10-CM

## 2019-01-01 DIAGNOSIS — R51.9 HEADACHE DISORDER: ICD-10-CM

## 2019-01-01 DIAGNOSIS — D50.9 IRON DEFICIENCY ANEMIA, UNSPECIFIED IRON DEFICIENCY ANEMIA TYPE: ICD-10-CM

## 2019-01-01 DIAGNOSIS — C50.919 METASTATIC BREAST CANCER (HCC): ICD-10-CM

## 2019-01-01 DIAGNOSIS — M84.411A PATHOLOGICAL FRACTURE OF RIGHT CLAVICLE, INITIAL ENCOUNTER: Primary | ICD-10-CM

## 2019-01-01 DIAGNOSIS — E87.6 LOW SERUM POTASSIUM: ICD-10-CM

## 2019-01-01 DIAGNOSIS — D64.9 ANEMIA, UNSPECIFIED TYPE: ICD-10-CM

## 2019-01-01 DIAGNOSIS — R59.9 SWOLLEN LYMPH NODES: Primary | ICD-10-CM

## 2019-01-01 DIAGNOSIS — R53.0 NEOPLASTIC MALIGNANT RELATED FATIGUE: ICD-10-CM

## 2019-01-01 LAB
ALBUMIN SERPL-MCNC: 4 G/DL (ref 3.4–5)
ALBUMIN/GLOB SERPL: 0.9 {RATIO} (ref 1–2)
ALP LIVER SERPL-CCNC: 108 U/L (ref 41–108)
ALT SERPL-CCNC: 24 U/L (ref 13–56)
AMB EXT CHOL/HDL RATIO: 3.2
AMB EXT CHOLESTEROL, TOTAL: 227 MG/DL
AMB EXT CREATININE: 0.85 MG/DL
AMB EXT GLUCOSE: 95 MG/DL
AMB EXT HDL CHOLESTEROL: 70 MG/DL
AMB EXT HEMATOCRIT: 27.5
AMB EXT HEMOGLOBIN: 9.1
AMB EXT LDL CHOLESTEROL, DIRECT: 139 MG/DL
AMB EXT MCV: 91
AMB EXT PLATELETS: 235
AMB EXT TRIGLYCERIDES: 91 MG/DL
AMB EXT VLDL: 18 MG/DL
AMB EXT WBC: 4.9 X10(3)UL
ANION GAP SERPL CALC-SCNC: 7 MMOL/L (ref 0–18)
AST SERPL-CCNC: 25 U/L (ref 15–37)
BASOPHILS # BLD AUTO: 0.01 X10(3) UL (ref 0–0.2)
BASOPHILS NFR BLD AUTO: 0.2 %
BILIRUB SERPL-MCNC: 0.4 MG/DL (ref 0.1–2)
BRCA1 C.185DELAG BLD/T QL: 577.9 U/ML (ref ?–38)
BUN BLD-MCNC: 23 MG/DL (ref 7–18)
BUN/CREAT SERPL: 25.3 (ref 10–20)
CALCIUM BLD-MCNC: 10 MG/DL (ref 8.5–10.1)
CHLORIDE SERPL-SCNC: 107 MMOL/L (ref 98–112)
CO2 SERPL-SCNC: 25 MMOL/L (ref 21–32)
CREAT BLD-MCNC: 0.8 MG/DL (ref 0.55–1.02)
CREAT BLD-MCNC: 0.91 MG/DL (ref 0.55–1.02)
DEPRECATED RDW RBC AUTO: 60.8 FL (ref 35.1–46.3)
EOSINOPHIL # BLD AUTO: 0.05 X10(3) UL (ref 0–0.7)
EOSINOPHIL NFR BLD AUTO: 1.2 %
ERYTHROCYTE [DISTWIDTH] IN BLOOD BY AUTOMATED COUNT: 17.4 % (ref 11–15)
GLOBULIN PLAS-MCNC: 4.3 G/DL (ref 2.8–4.4)
GLUCOSE BLD-MCNC: 86 MG/DL (ref 70–99)
HAPTOGLOB SERPL-MCNC: 215 MG/DL (ref 30–200)
HCT VFR BLD AUTO: 29.1 % (ref 35–48)
HGB BLD-MCNC: 9 G/DL (ref 12–16)
HGB RETIC QN AUTO: 34.3 PG (ref 28.2–36.6)
IMM GRANULOCYTES # BLD AUTO: 0.03 X10(3) UL (ref 0–1)
IMM GRANULOCYTES NFR BLD: 0.7 %
IMM RETICS NFR: 0.35 RATIO (ref 0.1–0.3)
IRON SATURATION: 18 % (ref 15–50)
IRON SERPL-MCNC: 76 UG/DL (ref 50–170)
LDH SERPL L TO P-CCNC: 168 U/L (ref 84–246)
LYMPHOCYTES # BLD AUTO: 1.51 X10(3) UL (ref 1–4)
LYMPHOCYTES NFR BLD AUTO: 36.6 %
M PROTEIN MFR SERPL ELPH: 8.3 G/DL (ref 6.4–8.2)
MCH RBC QN AUTO: 29.9 PG (ref 26–34)
MCHC RBC AUTO-ENTMCNC: 30.9 G/DL (ref 31–37)
MCV RBC AUTO: 96.7 FL (ref 80–100)
MONOCYTES # BLD AUTO: 0.32 X10(3) UL (ref 0.1–1)
MONOCYTES NFR BLD AUTO: 7.7 %
NEUTROPHILS # BLD AUTO: 2.21 X10 (3) UL (ref 1.5–7.7)
NEUTROPHILS # BLD AUTO: 2.21 X10(3) UL (ref 1.5–7.7)
NEUTROPHILS NFR BLD AUTO: 53.6 %
OSMOLALITY SERPL CALC.SUM OF ELEC: 291 MOSM/KG (ref 275–295)
PLATELET # BLD AUTO: 233 10(3)UL (ref 150–450)
POTASSIUM SERPL-SCNC: 3.9 MMOL/L (ref 3.5–5.1)
RBC # BLD AUTO: 3.01 X10(6)UL (ref 3.8–5.3)
RETICS # AUTO: 77.4 X10(3) UL (ref 22.5–147.5)
RETICS/RBC NFR AUTO: 2.6 % (ref 0.5–2.5)
SODIUM SERPL-SCNC: 139 MMOL/L (ref 136–145)
TOTAL IRON BINDING CAPACITY: 423 UG/DL (ref 240–450)
TRANSFERRIN SERPL-MCNC: 284 MG/DL (ref 200–360)
WBC # BLD AUTO: 4.1 X10(3) UL (ref 4–11)

## 2019-01-01 PROCEDURE — 83540 ASSAY OF IRON: CPT | Performed by: FAMILY MEDICINE

## 2019-01-01 PROCEDURE — 99283 EMERGENCY DEPT VISIT LOW MDM: CPT

## 2019-01-01 PROCEDURE — 23500 CLTX CLAVICULAR FX W/O MNPJ: CPT

## 2019-01-01 PROCEDURE — 99214 OFFICE O/P EST MOD 30 MIN: CPT | Performed by: FAMILY MEDICINE

## 2019-01-01 PROCEDURE — 82565 ASSAY OF CREATININE: CPT

## 2019-01-01 PROCEDURE — 83550 IRON BINDING TEST: CPT | Performed by: FAMILY MEDICINE

## 2019-01-01 PROCEDURE — 36415 COLL VENOUS BLD VENIPUNCTURE: CPT | Performed by: FAMILY MEDICINE

## 2019-01-01 PROCEDURE — 73030 X-RAY EXAM OF SHOULDER: CPT | Performed by: EMERGENCY MEDICINE

## 2019-01-01 PROCEDURE — 70470 CT HEAD/BRAIN W/O & W/DYE: CPT | Performed by: INTERNAL MEDICINE

## 2019-01-01 PROCEDURE — 99282 EMERGENCY DEPT VISIT SF MDM: CPT

## 2019-01-01 RX ORDER — HYDROCODONE BITARTRATE AND ACETAMINOPHEN 5; 325 MG/1; MG/1
1 TABLET ORAL EVERY 4 HOURS PRN
Qty: 90 TABLET | Refills: 0 | Status: SHIPPED | OUTPATIENT
Start: 2019-01-01 | End: 2020-01-01

## 2019-01-01 RX ORDER — DIAZEPAM 2 MG/1
2 TABLET ORAL EVERY 8 HOURS PRN
Qty: 60 TABLET | Refills: 0 | Status: SHIPPED
Start: 2019-01-01 | End: 2019-01-01

## 2019-01-01 RX ORDER — MIRTAZAPINE 30 MG/1
30 TABLET, FILM COATED ORAL NIGHTLY
Qty: 30 TABLET | Refills: 5 | Status: SHIPPED | OUTPATIENT
Start: 2019-01-01 | End: 2020-01-01

## 2019-01-01 RX ORDER — HYDROCODONE BITARTRATE AND ACETAMINOPHEN 5; 325 MG/1; MG/1
1 TABLET ORAL EVERY 4 HOURS PRN
Qty: 30 TABLET | Refills: 0 | Status: SHIPPED | OUTPATIENT
Start: 2019-01-01 | End: 2019-01-01

## 2019-01-01 RX ORDER — DOXYCYCLINE HYCLATE 100 MG/1
100 CAPSULE ORAL 2 TIMES DAILY
Qty: 14 CAPSULE | Refills: 0 | Status: SHIPPED | OUTPATIENT
Start: 2019-01-01 | End: 2019-01-01

## 2019-01-01 RX ORDER — DIAZEPAM 2 MG/1
2 TABLET ORAL EVERY 8 HOURS PRN
Qty: 60 TABLET | Refills: 0 | Status: SHIPPED | OUTPATIENT
Start: 2019-01-01 | End: 2020-01-01

## 2019-01-01 RX ORDER — DIAZEPAM 2 MG/1
2 TABLET ORAL EVERY 8 HOURS PRN
Qty: 60 TABLET | Refills: 0 | OUTPATIENT
Start: 2019-01-01 | End: 2019-01-01

## 2019-01-01 RX ORDER — MIRTAZAPINE 15 MG/1
TABLET, FILM COATED ORAL
Qty: 90 TABLET | Refills: 1 | OUTPATIENT
Start: 2019-01-01

## 2019-01-01 RX ORDER — DIAZEPAM 2 MG/1
TABLET ORAL
Qty: 60 TABLET | Refills: 0 | OUTPATIENT
Start: 2019-01-01

## 2019-01-01 RX ORDER — ZOLPIDEM TARTRATE 10 MG/1
10 TABLET ORAL NIGHTLY
Qty: 30 TABLET | Refills: 3 | Status: SHIPPED | OUTPATIENT
Start: 2019-01-01 | End: 2020-01-01

## 2019-01-01 RX ORDER — ESOMEPRAZOLE MAGNESIUM 40 MG/1
40 CAPSULE, DELAYED RELEASE ORAL
Qty: 90 CAPSULE | Refills: 3 | Status: SHIPPED | OUTPATIENT
Start: 2019-01-01

## 2019-01-02 ENCOUNTER — NURSE NAVIGATOR ENCOUNTER (OUTPATIENT)
Dept: HEMATOLOGY/ONCOLOGY | Facility: HOSPITAL | Age: 54
End: 2019-01-02

## 2019-01-02 ENCOUNTER — OFFICE VISIT (OUTPATIENT)
Dept: HEMATOLOGY/ONCOLOGY | Facility: HOSPITAL | Age: 54
End: 2019-01-02
Attending: INTERNAL MEDICINE
Payer: COMMERCIAL

## 2019-01-02 ENCOUNTER — TELEPHONE (OUTPATIENT)
Dept: HEMATOLOGY/ONCOLOGY | Facility: HOSPITAL | Age: 54
End: 2019-01-02

## 2019-01-02 VITALS
RESPIRATION RATE: 18 BRPM | TEMPERATURE: 99 F | HEIGHT: 59 IN | HEART RATE: 111 BPM | DIASTOLIC BLOOD PRESSURE: 81 MMHG | WEIGHT: 128.5 LBS | BODY MASS INDEX: 25.91 KG/M2 | SYSTOLIC BLOOD PRESSURE: 119 MMHG | OXYGEN SATURATION: 99 %

## 2019-01-02 DIAGNOSIS — C79.51 BONE METASTASES (HCC): ICD-10-CM

## 2019-01-02 DIAGNOSIS — C50.919 METASTATIC BREAST CANCER (HCC): Primary | ICD-10-CM

## 2019-01-02 DIAGNOSIS — F41.9 ANXIETY: ICD-10-CM

## 2019-01-02 DIAGNOSIS — N91.2 AMENORRHEA: ICD-10-CM

## 2019-01-02 PROBLEM — G89.3 CANCER ASSOCIATED PAIN: Status: ACTIVE | Noted: 2019-01-02

## 2019-01-02 PROCEDURE — 99245 OFF/OP CONSLTJ NEW/EST HI 55: CPT | Performed by: INTERNAL MEDICINE

## 2019-01-02 RX ORDER — TRAMADOL HYDROCHLORIDE 50 MG/1
50 TABLET ORAL EVERY 6 HOURS PRN
Qty: 40 TABLET | Refills: 0 | Status: SHIPPED | OUTPATIENT
Start: 2019-01-02 | End: 2019-01-24

## 2019-01-02 RX ORDER — LORAZEPAM 0.5 MG/1
0.5 TABLET ORAL EVERY 6 HOURS PRN
Qty: 40 TABLET | Refills: 0 | Status: SHIPPED | OUTPATIENT
Start: 2019-01-02 | End: 2019-01-09

## 2019-01-02 RX ORDER — MULTIVIT WITH MINERALS/LUTEIN
500 TABLET ORAL DAILY
COMMUNITY

## 2019-01-02 RX ORDER — MULTIVIT-MIN/IRON/FOLIC ACID/K 18-600-40
4000 CAPSULE ORAL
COMMUNITY

## 2019-01-02 NOTE — PROGRESS NOTES
Met with patient briefly today after visit with Dr. Ginette Mariscal. Patient was given information and hand outs on recommended treatment. She is unsure of her decision at this time, she is going to phone the office. Patient would need biopsy.  Genetic testing was r

## 2019-01-02 NOTE — PROGRESS NOTES
MD consult to discuss POC of metastatic breast ca.      Outpatient Oncology Care Plan  Problem list:  knowledge deficit    Problems related to:    disease/disease progression    Interventions:  provided general teaching    Expected outcomes:  understands pl

## 2019-01-02 NOTE — CONSULTS
Cancer Center Report of Consultation    Patient Name: Jess Caal   YOB: 1965   Medical Record Number: [id number]   CSN: [id number]   Consulting Physician: Jeferson Castellon MD  Referring Physician(s): Nori Nino MD  Date of Consultation breast 2010   • Dysmenorrhea    • Fibroids    • Fibromyalgia    • Human papilloma virus infection    • Other and unspecified hyperlipidemia    • Transfusion history        Past Surgical History:  Past Surgical History:   Procedure Laterality Date   • BREAS voice change.   Respiratory: No cough, sputum, hemoptysis, chest pain, wheezing, dyspnea on exertion  Cardiovascular: No chest pain, palpitations, syncope,orthopnea, PND, edema  Gastrointestinal:No dysphagia, odynophagia, nausea, vomiting, diarrhea, abdomin Calculated Osmolality 284 275 - 295 mOsm/kg    GFR, Non- 52 (L) >=60    GFR, -American 60 >=60    AST 56 (H) 15 - 41 U/L    Alt 28 14 - 54 U/L    Alkaline Phosphatase 123 (H) 41 - 108 U/L    Bilirubin, Total 0.4 0.1 - 2.0 mg/dL    To 0. 09 0.00 - 0.30 x10(3) uL    Basophil Absolute 0.04 0.00 - 0.10 x10(3) uL    Immature Granulocyte Absolute 0.10 0.00 - 1.00 x10(3) uL    Neutrophil % 60.2 %    Lymphocyte % 28.1 %    Monocyte % 8.8 %    Eosinophil % 1.1 %    Basophil % 0.5 %    Immature G spent face-to-face on counseling and coordination of care. We discussed  patient’s prognosis, treatment options available for metastatic breast ca, including side effects of treatments. The patient/ were given ample opportunity to ask questions.

## 2019-01-02 NOTE — TELEPHONE ENCOUNTER
Eb Huang MD  [facility] [address] [facility]             Call, breast tumor marker elevated as expected. She should let us know her decision in couple days if she wishes to proceed with ovarian suppression and xgeva and bone bx.  I put plan in to see if cover

## 2019-01-02 NOTE — PATIENT INSTRUCTIONS
For Dr. Josh Singh nurse line, call [phone] with any questions or concerns, Monday through Friday 8:00a to 4:30p.      After hours or weekends for emergent needs [phone]    To schedule testing, Central [phone]  For Medical Records: 35

## 2019-01-03 ENCOUNTER — TELEPHONE (OUTPATIENT)
Dept: HEMATOLOGY/ONCOLOGY | Facility: HOSPITAL | Age: 54
End: 2019-01-03

## 2019-01-04 ENCOUNTER — TELEPHONE (OUTPATIENT)
Dept: HEMATOLOGY/ONCOLOGY | Facility: HOSPITAL | Age: 54
End: 2019-01-04

## 2019-01-04 NOTE — TELEPHONE ENCOUNTER
Spoke with pt- states she is very anxious to proceed with procedure and is requesting additional reasoning as to why she needs to have biopsy done. Reinforced the reasoning/need for biopsy and she was made aware that she will be sedated during procedure.

## 2019-01-04 NOTE — TELEPHONE ENCOUNTER
Patient returned call is ready to proceed with bone biopsy, ovarian suppression and xgeva. Also wanted Dr. Carter García to know that one Tramadol 50mg did not relieve her pain. When she took Tramadol 2 tabs (100mg) . Had pain relief for the last 8 hours.   Please

## 2019-01-04 NOTE — TELEPHONE ENCOUNTER
Attempted to call pt to discuss MD orders, but NA. Left VM requesting pt to call back RN triage line.

## 2019-01-07 ENCOUNTER — TELEPHONE (OUTPATIENT)
Dept: HEMATOLOGY/ONCOLOGY | Facility: HOSPITAL | Age: 54
End: 2019-01-07

## 2019-01-07 NOTE — TELEPHONE ENCOUNTER
Pt requesting to speak with Dr. Ginette Mariscal re: some questions about her treatment plan. Message forwarded to Dr. Ginette Mariscal.

## 2019-01-07 NOTE — TELEPHONE ENCOUNTER
Pt calling for Tramadol refill. States \"Dr. Raissa Bates was going to give Tramadol refill when needed bc pt increased dose to 2 50mg tablets every 6hrs. bc  1 50mg tablet q. 6 hrs was not providing pain relief\".      Discussed with HECTOR Mcfadden along with RN not

## 2019-01-08 ENCOUNTER — TELEPHONE (OUTPATIENT)
Dept: HEMATOLOGY/ONCOLOGY | Facility: HOSPITAL | Age: 54
End: 2019-01-08

## 2019-01-09 ENCOUNTER — TELEPHONE (OUTPATIENT)
Dept: HEMATOLOGY/ONCOLOGY | Facility: HOSPITAL | Age: 54
End: 2019-01-09

## 2019-01-09 RX ORDER — LORAZEPAM 0.5 MG/1
TABLET ORAL
Qty: 40 TABLET | Refills: 0 | OUTPATIENT
Start: 2019-01-09

## 2019-01-09 RX ORDER — LORAZEPAM 0.5 MG/1
0.5 TABLET ORAL EVERY 6 HOURS PRN
Qty: 40 TABLET | Refills: 0 | Status: SHIPPED
Start: 2019-01-09 | End: 2019-01-28

## 2019-01-09 NOTE — TELEPHONE ENCOUNTER
Spoke to patient briefly, to ensure that her questions were answered. She would prefer to speak with Dr. Brenda Martin directly. Encouraged her to phone if I could be of any further assistance.

## 2019-01-09 NOTE — TELEPHONE ENCOUNTER
Pt states she has been taking lorazepam 0.5 mg every 6 hours for anxiety and reports that this has been helping her. She also states that she is cancelling her bone bx for this Friday, as she is not ready to proceed with procedure.  She would like a call ba

## 2019-01-11 ENCOUNTER — TELEPHONE (OUTPATIENT)
Dept: HEMATOLOGY/ONCOLOGY | Facility: HOSPITAL | Age: 54
End: 2019-01-11

## 2019-01-11 ENCOUNTER — HOSPITAL ENCOUNTER (OUTPATIENT)
Dept: CT IMAGING | Facility: HOSPITAL | Age: 54
Discharge: HOME | End: 2019-01-11
Attending: INTERNAL MEDICINE
Payer: COMMERCIAL

## 2019-01-11 NOTE — TELEPHONE ENCOUNTER
Spoke to patient at length about plan of care. She is agreeable to bone biopsy but would like complete sedation. Discussed with IR (Dr. Heywood Boxer), ordered with anesthesia.   She has a lot of night sweats, fatigue, malaise-discussed all symptoms of untreat

## 2019-01-15 ENCOUNTER — TELEPHONE (OUTPATIENT)
Dept: FAMILY MEDICINE CLINIC | Facility: CLINIC | Age: 54
End: 2019-01-15

## 2019-01-15 ENCOUNTER — TELEPHONE (OUTPATIENT)
Dept: HEMATOLOGY/ONCOLOGY | Facility: HOSPITAL | Age: 54
End: 2019-01-15

## 2019-01-15 RX ORDER — CIPROFLOXACIN 500 MG/1
500 TABLET, FILM COATED ORAL 2 TIMES DAILY
Qty: 10 TABLET | Refills: 0 | Status: SHIPPED | OUTPATIENT
Start: 2019-01-15 | End: 2019-02-04 | Stop reason: ALTCHOICE

## 2019-01-15 RX ORDER — SODIUM CHLORIDE 9 MG/ML
INJECTION, SOLUTION INTRAVENOUS CONTINUOUS
Status: CANCELLED | OUTPATIENT
Start: 2019-01-15

## 2019-01-15 NOTE — TELEPHONE ENCOUNTER
Pt feels like she has a UTI again & blood in urine & is requesting a refill on Cipro. Pt declines coming in for OV or to repeat urine culture as ordered on 12/17. LOV 12/7, LF 12/17. Please approve or deny pending Cipro Rx or advise on orders for pt.

## 2019-01-15 NOTE — TELEPHONE ENCOUNTER
Patient Isaiah Wiliam called stating that she wanted to know if provider would extend her script for abx as symptoms persist.  Patient was directed previously to have 2nd urine culture completed and has not.   Patient understands that this testing needs to be comple

## 2019-01-15 NOTE — TELEPHONE ENCOUNTER
Pt left  stating that she has bone bx scheduled for 1/24. She is requesting to have an EKG or echo done prior to sedation. Will have MD advise.

## 2019-01-16 ENCOUNTER — HOSPITAL ENCOUNTER (OUTPATIENT)
Dept: CT IMAGING | Facility: HOSPITAL | Age: 54
Discharge: HOME | End: 2019-01-16
Attending: INTERNAL MEDICINE
Payer: COMMERCIAL

## 2019-01-16 DIAGNOSIS — C50.919 MALIGNANT TUMOR OF BREAST (HCC): Primary | ICD-10-CM

## 2019-01-16 RX ORDER — ONDANSETRON HYDROCHLORIDE 8 MG/1
8 TABLET, FILM COATED ORAL EVERY 8 HOURS PRN
Qty: 30 TABLET | Refills: 0 | Status: SHIPPED | OUTPATIENT
Start: 2019-01-16 | End: 2019-01-24

## 2019-01-16 NOTE — TELEPHONE ENCOUNTER
Attempted to call pt this morning to discuss order for EKG, but NA. Pt also called at 0250 this morning c/o feeling nausea with tramadol. Dr. Sameera Weinstein aware and would like pt to see HECTOR Martin for pain management.      Left VM for pt requesting her to ret

## 2019-01-16 NOTE — TELEPHONE ENCOUNTER
Spoke with pt- she is asking for anti-nausea medication to take with tramadol. Okay per MD to send script for zofran. Pt aware of orders to see APN for pain management. Oksergio per MD for pt to f/u on 1/28 in [facility].  Will have PSR contact pt to schedule a t

## 2019-01-22 ENCOUNTER — OFFICE VISIT (OUTPATIENT)
Dept: HEMATOLOGY/ONCOLOGY | Age: 54
End: 2019-01-22
Attending: NURSE PRACTITIONER
Payer: COMMERCIAL

## 2019-01-22 DIAGNOSIS — F41.9 ANXIETY: ICD-10-CM

## 2019-01-22 DIAGNOSIS — G89.3 NEOPLASM RELATED PAIN: Primary | ICD-10-CM

## 2019-01-22 PROCEDURE — 99215 OFFICE O/P EST HI 40 MIN: CPT | Performed by: NURSE PRACTITIONER

## 2019-01-22 RX ORDER — BUSPIRONE HYDROCHLORIDE 5 MG/1
5 TABLET ORAL 3 TIMES DAILY
Qty: 30 TABLET | Refills: 1 | Status: SHIPPED | OUTPATIENT
Start: 2019-01-22 | End: 2019-01-24

## 2019-01-22 RX ORDER — ALPRAZOLAM 0.5 MG/1
0.5 TABLET ORAL NIGHTLY PRN
Qty: 30 TABLET | Refills: 1 | Status: SHIPPED | OUTPATIENT
Start: 2019-01-22 | End: 2019-01-28

## 2019-01-24 ENCOUNTER — HOSPITAL ENCOUNTER (OUTPATIENT)
Dept: CT IMAGING | Facility: HOSPITAL | Age: 54
Discharge: HOME | End: 2019-01-24
Attending: INTERNAL MEDICINE
Payer: COMMERCIAL

## 2019-01-24 ENCOUNTER — NURSE NAVIGATOR ENCOUNTER (OUTPATIENT)
Dept: HEMATOLOGY/ONCOLOGY | Facility: HOSPITAL | Age: 54
End: 2019-01-24

## 2019-01-24 ENCOUNTER — TELEPHONE (OUTPATIENT)
Dept: HEMATOLOGY/ONCOLOGY | Facility: HOSPITAL | Age: 54
End: 2019-01-24

## 2019-01-24 VITALS
DIASTOLIC BLOOD PRESSURE: 74 MMHG | HEIGHT: 59.02 IN | BODY MASS INDEX: 25.67 KG/M2 | WEIGHT: 127.31 LBS | HEART RATE: 79 BPM | OXYGEN SATURATION: 100 % | SYSTOLIC BLOOD PRESSURE: 110 MMHG | TEMPERATURE: 98 F

## 2019-01-24 PROBLEM — Z51.5 PALLIATIVE CARE BY SPECIALIST: Status: ACTIVE | Noted: 2019-01-24

## 2019-01-24 PROBLEM — G89.3 NEOPLASM RELATED PAIN: Status: ACTIVE | Noted: 2019-01-24

## 2019-01-24 RX ORDER — BUSPIRONE HYDROCHLORIDE 7.5 MG/1
7.5 TABLET ORAL 2 TIMES DAILY
Qty: 60 TABLET | Refills: 1 | Status: SHIPPED | OUTPATIENT
Start: 2019-01-24 | End: 2019-01-28

## 2019-01-24 NOTE — TELEPHONE ENCOUNTER
Patient called stating she is willing to try RT to R rib area since this pain is more problematic and she doesn't want to use meds. Spoke with Dr. Leonard Gorman. Patient has appointment next week with her to further discuss treatment options.   She should keep

## 2019-01-24 NOTE — PROGRESS NOTES
Returned patient's phone call. Patient states that she met with Yvonne Reyna and discussed goals of care. She does not want to proceed with a biopsy of any sort. She does not understand how this would help her treatment.  We discussed the possibilit

## 2019-01-24 NOTE — PROGRESS NOTES
Palliative Care Consult Note     Patient Name: Rita Rhoades   YOB: 1965   Medical Record Number: [id number]   CSN: [id number]   Date of visit: 1/24/2019     Reason for Consult:  Symptom management and goals of care    Chief Complaint/Reason f state, unspecified    • Back pain    • Calculus of kidney 2005   • Cancer of breast ([facility] [address]     cole. breast 2010   • Dysmenorrhea    • Fibroids    • Fibromyalgia    • Human papilloma virus infection    • Other and unspecified hyperlipidemia    • Transfusion Disp: 30 tablet Rfl: 1   Ciprofloxacin HCl (CIPRO) 500 MG Oral Tab Take 1 tablet (500 mg total) by mouth 2 (two) times daily.  Disp: 10 tablet Rfl: 0   LORazepam 0.5 MG Oral Tab Take 1 tablet (0.5 mg total) by mouth every 6 (six) hours as needed for Anxiety She doesn't like ativan. She doesn't want to trail antidepressant but is willing to trial buspirone and xanax. Impression/Plan:   1. Neoplasm related pain  Acetaminophen 1G Q 6 prn    2.  Anxiety  Buspirone 5mg TID  Xanax 0.5mg Q HS prn      Planned

## 2019-01-25 ENCOUNTER — TELEPHONE (OUTPATIENT)
Dept: HEMATOLOGY/ONCOLOGY | Facility: HOSPITAL | Age: 54
End: 2019-01-25

## 2019-01-25 NOTE — TELEPHONE ENCOUNTER
LM for pt. She had called yesterday. Cancelled bx that she had agreed to do in past. Wants to consider RT for pain, no meds. Does not wish any Rx for presumed met breast ca. In summary, past breast ca, refused Rx.  Saw Dr. Mara Vega in past, does not want to

## 2019-01-28 ENCOUNTER — TELEPHONE (OUTPATIENT)
Dept: HEMATOLOGY/ONCOLOGY | Facility: HOSPITAL | Age: 54
End: 2019-01-28

## 2019-01-28 ENCOUNTER — APPOINTMENT (OUTPATIENT)
Dept: HEMATOLOGY/ONCOLOGY | Age: 54
End: 2019-01-28
Attending: NURSE PRACTITIONER
Payer: COMMERCIAL

## 2019-01-28 RX ORDER — LORAZEPAM 1 MG/1
1 TABLET ORAL EVERY 6 HOURS PRN
Qty: 30 TABLET | Refills: 1 | OUTPATIENT
Start: 2019-01-28 | End: 2019-02-07

## 2019-01-28 NOTE — TELEPHONE ENCOUNTER
Patient called stating xanax is making her feel worse and \"more jittery\". She doesn't want to take the buspar as she needs something that is going to help her panic attacks right away.   Discussed continuing use of buspar but she isn't interested right

## 2019-01-28 NOTE — TELEPHONE ENCOUNTER
Patient called to cancel today's appt with Dr. Leonard Gorman due to the weather. Called patient back and left a voicemail that message was received. In-basket sent to the [facility] to call and schedule another appointment for the patient.

## 2019-01-31 ENCOUNTER — TELEPHONE (OUTPATIENT)
Dept: HEMATOLOGY/ONCOLOGY | Facility: HOSPITAL | Age: 54
End: 2019-01-31

## 2019-01-31 RX ORDER — DIAZEPAM 2 MG/1
2 TABLET ORAL EVERY 6 HOURS PRN
Qty: 10 TABLET | Refills: 0 | OUTPATIENT
Start: 2019-01-31 | End: 2019-02-05

## 2019-01-31 NOTE — TELEPHONE ENCOUNTER
Patient called stating she doesn't like how ativan makes her feel. It caused N/v and gave her a hang over. She still feels anxious and needs something to help \"calm\" her. She has used valium in the past without these SE.   She would like to try this

## 2019-02-04 ENCOUNTER — TELEPHONE (OUTPATIENT)
Dept: FAMILY MEDICINE CLINIC | Facility: CLINIC | Age: 54
End: 2019-02-04

## 2019-02-04 ENCOUNTER — OFFICE VISIT (OUTPATIENT)
Dept: HEMATOLOGY/ONCOLOGY | Age: 54
End: 2019-02-04
Attending: NURSE PRACTITIONER
Payer: COMMERCIAL

## 2019-02-04 VITALS
RESPIRATION RATE: 18 BRPM | TEMPERATURE: 99 F | HEART RATE: 102 BPM | OXYGEN SATURATION: 100 % | BODY MASS INDEX: 24 KG/M2 | WEIGHT: 120 LBS

## 2019-02-04 DIAGNOSIS — C79.51 BONE METASTASES (HCC): ICD-10-CM

## 2019-02-04 DIAGNOSIS — D64.9 ANEMIA, UNSPECIFIED TYPE: ICD-10-CM

## 2019-02-04 DIAGNOSIS — M89.8X9 BONE PAIN: ICD-10-CM

## 2019-02-04 DIAGNOSIS — C50.919 METASTATIC BREAST CANCER (HCC): Primary | ICD-10-CM

## 2019-02-04 DIAGNOSIS — R10.9 ABDOMINAL PAIN, UNSPECIFIED ABDOMINAL LOCATION: ICD-10-CM

## 2019-02-04 DIAGNOSIS — F41.9 ANXIETY: ICD-10-CM

## 2019-02-04 DIAGNOSIS — R12 HEARTBURN: ICD-10-CM

## 2019-02-04 LAB
ALBUMIN SERPL-MCNC: 4.2 G/DL (ref 3.1–4.5)
ALBUMIN/GLOB SERPL: 1 {RATIO} (ref 1–2)
ALP LIVER SERPL-CCNC: 124 U/L (ref 41–108)
ALT SERPL-CCNC: 48 U/L (ref 14–54)
ANION GAP SERPL CALC-SCNC: 8 MMOL/L (ref 0–18)
AST SERPL-CCNC: 45 U/L (ref 15–41)
BASOPHILS # BLD AUTO: 0.04 X10(3) UL (ref 0–0.2)
BASOPHILS NFR BLD AUTO: 0.7 %
BILIRUB SERPL-MCNC: 0.5 MG/DL (ref 0.1–2)
BRCA1 C.185DELAG BLD/T QL: 884.4 U/ML (ref ?–38)
BUN BLD-MCNC: 18 MG/DL (ref 8–20)
BUN/CREAT SERPL: 17.6 (ref 10–20)
CALCIUM BLD-MCNC: 10.2 MG/DL (ref 8.3–10.3)
CHLORIDE SERPL-SCNC: 106 MMOL/L (ref 101–111)
CO2 SERPL-SCNC: 26 MMOL/L (ref 22–32)
CREAT BLD-MCNC: 1.02 MG/DL (ref 0.55–1.02)
DEPRECATED RDW RBC AUTO: 65.3 FL (ref 35.1–46.3)
EOSINOPHIL # BLD AUTO: 0.1 X10(3) UL (ref 0–0.7)
EOSINOPHIL NFR BLD AUTO: 1.8 %
ERYTHROCYTE [DISTWIDTH] IN BLOOD BY AUTOMATED COUNT: 18.5 % (ref 11–15)
GLOBULIN PLAS-MCNC: 4.3 G/DL (ref 2.8–4.4)
GLUCOSE BLD-MCNC: 109 MG/DL (ref 70–99)
HCT VFR BLD AUTO: 30.6 % (ref 35–48)
HGB BLD-MCNC: 9.5 G/DL (ref 12–16)
IMM GRANULOCYTES # BLD AUTO: 0.07 X10(3) UL (ref 0–1)
IMM GRANULOCYTES NFR BLD: 1.3 %
LYMPHOCYTES # BLD AUTO: 2.17 X10(3) UL (ref 1–4)
LYMPHOCYTES NFR BLD AUTO: 39 %
M PROTEIN MFR SERPL ELPH: 8.5 G/DL (ref 6.4–8.2)
MCH RBC QN AUTO: 30.2 PG (ref 26–34)
MCHC RBC AUTO-ENTMCNC: 31 G/DL (ref 31–37)
MCV RBC AUTO: 97.1 FL (ref 80–100)
MONOCYTES # BLD AUTO: 0.39 X10(3) UL (ref 0.1–1)
MONOCYTES NFR BLD AUTO: 7 %
MORPHOLOGY: NORMAL
NEUTROPHILS # BLD AUTO: 2.8 X10 (3) UL (ref 1.5–7.7)
NEUTROPHILS # BLD AUTO: 2.8 X10(3) UL (ref 1.5–7.7)
NEUTROPHILS NFR BLD AUTO: 50.2 %
OSMOLALITY SERPL CALC.SUM OF ELEC: 292 MOSM/KG (ref 275–295)
PLATELET # BLD AUTO: 301 10(3)UL (ref 150–450)
PLATELET MORPHOLOGY: NORMAL
POTASSIUM SERPL-SCNC: 3.8 MMOL/L (ref 3.6–5.1)
RBC # BLD AUTO: 3.15 X10(6)UL (ref 3.8–5.3)
ROULEAUX BLD QL SMEAR: PRESENT
SODIUM SERPL-SCNC: 140 MMOL/L (ref 136–144)
WBC # BLD AUTO: 5.6 X10(3) UL (ref 4–11)

## 2019-02-04 PROCEDURE — 99215 OFFICE O/P EST HI 40 MIN: CPT | Performed by: INTERNAL MEDICINE

## 2019-02-04 RX ORDER — ESOMEPRAZOLE MAGNESIUM 20 MG/1
20 FOR SUSPENSION ORAL
Refills: 0 | Status: CANCELLED | OUTPATIENT
Start: 2019-02-04

## 2019-02-04 RX ORDER — ESOMEPRAZOLE MAGNESIUM 40 MG/1
40 CAPSULE, DELAYED RELEASE ORAL
Qty: 90 CAPSULE | Refills: 0 | Status: SHIPPED | OUTPATIENT
Start: 2019-02-04 | End: 2019-01-01

## 2019-02-04 NOTE — TELEPHONE ENCOUNTER
Patient reporting increased acid reflux/GERD since being on several different medications. She said it was so bad for a little while she was having some vomiting and she did not want to eat. This has gotten better though and she has not vomited in 4 days.

## 2019-02-04 NOTE — TELEPHONE ENCOUNTER
Patient is having lots of pain in her stomach from all of the medications that she is on. Patient would like to know what she should do. Please advise.

## 2019-02-04 NOTE — PROGRESS NOTES
[facility] Progress Note      Patient Name:  Marcelino Mckenzie  YOB: 1965  Medical Record Number:  [id number]    Date of visit:  2/4/2019    CHIEF COMPLAINT: Metastatic breast carcinoma, bone metastasis.     HPI:     46year old that I every 6 (six) hours as needed for Anxiety. Disp: 10 tablet Rfl: 0   LORAZEPAM 1 MG Oral Tab Take 1 tablet (1 mg total) by mouth every 6 (six) hours as needed for Anxiety.  Disp: 30 tablet Rfl: 1   Ascorbic Acid (VITAMIN C) 1000 MG Oral Tab Take 500 mg by mo g/dL    A/G Ratio 1.0 1.0 - 2.0   CBC W/ DIFFERENTIAL    Collection Time: 02/04/19  3:55 PM   Result Value Ref Range    WBC 5.6 4.0 - 11.0 x10(3) uL    RBC 3.15 (L) 3.80 - 5.30 x10(6)uL    HGB 9.5 (L) 12.0 - 16.0 g/dL    HCT 30.6 (L) 35.0 - 48.0 %    PLT 3 for met breast ca, including side effects of treatments. She is concerned about additional treatment. Continues to have significant symptoms. Feels that nobody is listening to her, feels that she is just in number.   Plan to restage and make a decision

## 2019-02-04 NOTE — PROGRESS NOTES
Pt here for MD f/u visit to discuss goals of care. Pt c/o fatigue, not sleeping well. Occasional stomach discomfort. Continues on Diazepam 2mg every 6hrs as needed.    Outpatient Oncology Care Plan  Problem list:  knowledge deficit    Problems related to:

## 2019-02-05 ENCOUNTER — OFFICE VISIT (OUTPATIENT)
Dept: HEMATOLOGY/ONCOLOGY | Age: 54
End: 2019-02-05
Attending: NURSE PRACTITIONER
Payer: COMMERCIAL

## 2019-02-05 LAB
DEPRECATED HBV CORE AB SER IA-ACNC: 1110.7 NG/ML (ref 18–340)
HAV AB SERPL IA-ACNC: >2000 PG/ML (ref 193–986)
HGB RETIC QN AUTO: 38.3 PG (ref 28.2–36.6)
IMM RETICS NFR: 0.35 RATIO (ref 0.1–0.3)
IRON SATURATION: 33 % (ref 15–50)
IRON: 130 UG/DL (ref 28–170)
LDH: 219 U/L (ref 84–246)
RETICS # AUTO: 83.2 X10(3) UL (ref 22.5–147.5)
RETICS/RBC NFR AUTO: 2.7 % (ref 0.5–2.5)
TOTAL IRON BINDING CAPACITY: 395 UG/DL (ref 240–450)
TRANSFERRIN SERPL-MCNC: 265 MG/DL (ref 200–360)

## 2019-02-07 NOTE — PROGRESS NOTES
Palliative Care Follow Up Note     Patient Name: Rahul Noble   YOB: 1965   Medical Record Number: [id number]   CSN: [id number]   Date of visit: 2/7/2019     Chief Complaint/Reason for Visit:  Palliative care follow up     History of Present SILICONE      8/34/02   • BREAST RECONSTRUCTION SECOND STAGE/ REVISION Bilateral 9/22/2014    Performed by Katty Yanez MD at [facility] MAIN OR   • BREAST SURGERY     • COLPOSCOPY, CERVIX W/UPPER ADJACENT VAGINA; W/BIOPSY(S), CERVIX  1999   • LEEP  1999   • MAST Disp:  Rfl:    Cholecalciferol (VITAMIN D) 2000 units Oral Cap Take 4,000 Units by mouth. Disp:  Rfl:    TURMERIC CURCUMIN OR Take by mouth. Disp:  Rfl:        Review of Systems:  General:  Fatigue. Feels well.     Respiratory:  Denies SOB, denies cough  C

## 2019-02-10 LAB
ALK-PHOSPHATASE BONE CALC: 63 U/L
ALK-PHOSPHATASE LIVER CALC: 58 U/L
ALK-PHOSPHATASE OTHER CALC: 0 U/L
ALKALINE PHOSPHATASE: 121 U/L

## 2019-02-22 ENCOUNTER — SOCIAL WORK SERVICES (OUTPATIENT)
Dept: HEMATOLOGY/ONCOLOGY | Facility: HOSPITAL | Age: 54
End: 2019-02-22

## 2019-02-22 NOTE — PROGRESS NOTES
HERNANDO left  for patient. Maribell, palliative care NP, states that patient has questions about medical marijuana.

## 2019-02-25 ENCOUNTER — SOCIAL WORK SERVICES (OUTPATIENT)
Dept: HEMATOLOGY/ONCOLOGY | Facility: HOSPITAL | Age: 54
End: 2019-02-25

## 2019-02-25 NOTE — PROGRESS NOTES
SW received call from patient regarding medical marijuana. SW completed Physician form and either patient will come to , or SW to mail application to patient. SW left vm regarding maling the application.

## 2019-02-25 NOTE — PROGRESS NOTES
SW spoke with patient and explained medical marijuana process to her and mailed to her home address the application and instructions.

## 2019-02-26 ENCOUNTER — TELEPHONE (OUTPATIENT)
Dept: FAMILY MEDICINE CLINIC | Facility: CLINIC | Age: 54
End: 2019-02-26

## 2019-03-11 ENCOUNTER — NURSE NAVIGATOR ENCOUNTER (OUTPATIENT)
Dept: HEMATOLOGY/ONCOLOGY | Facility: HOSPITAL | Age: 54
End: 2019-03-11

## 2019-03-11 NOTE — PROGRESS NOTES
Pt phoned regarding plan of care and prognosis. Patient would like to know how long she has to live. We discussed that we need to do the re-staging to further understand her disease.  Pt is agreeable to CT scan, but does not want to do bone scan, as these s

## 2019-03-14 ENCOUNTER — TELEPHONE (OUTPATIENT)
Dept: FAMILY MEDICINE CLINIC | Facility: CLINIC | Age: 54
End: 2019-03-14

## 2019-03-14 DIAGNOSIS — F41.9 ANXIETY: ICD-10-CM

## 2019-03-14 RX ORDER — MIRTAZAPINE 15 MG/1
15 TABLET, FILM COATED ORAL NIGHTLY
Qty: 30 TABLET | Refills: 0 | Status: SHIPPED | OUTPATIENT
Start: 2019-03-14 | End: 2019-04-04

## 2019-03-14 RX ORDER — DIAZEPAM 10 MG/1
10 TABLET ORAL NIGHTLY PRN
Qty: 30 TABLET | Refills: 5 | Status: CANCELLED
Start: 2019-03-14

## 2019-03-14 RX ORDER — DIAZEPAM 2 MG/1
2 TABLET ORAL EVERY 8 HOURS PRN
Qty: 60 TABLET | Refills: 3 | Status: SHIPPED
Start: 2019-03-14 | End: 2019-01-01

## 2019-03-14 NOTE — TELEPHONE ENCOUNTER
I spoke with pt for 40 min: Pt stopped her Bupropion ER XL 300mg in January & she would like to re-start it. She states she is very anxious & crying all the time. She feels no on is listening to her on how she wants to handle her cancer.  She just applied

## 2019-03-14 NOTE — TELEPHONE ENCOUNTER
Lets do Mirtazapine 15mg po qhs, helps depression, apatitie and sleep, I will send it, ok for BP check with nurse. I can send a nurse to her home its cover by insurance, do all pain meds, etc, its very good service. Bupropion is good for weight loss.

## 2019-03-15 RX ORDER — ONDANSETRON 4 MG/1
4 TABLET, ORALLY DISINTEGRATING ORAL EVERY 4 HOURS PRN
Qty: 60 TABLET | Refills: 3 | Status: SHIPPED | OUTPATIENT
Start: 2019-03-15

## 2019-03-15 NOTE — TELEPHONE ENCOUNTER
Pt notified of below orders. Pt states she will call back Monday for possible Palliative Care Orders. Pt requests refill on Zofran. She states she is not allergic, it helped her in the past. All questions answered, pt expresses understanding.

## 2019-03-15 NOTE — TELEPHONE ENCOUNTER
Zofran refill request.  LF in ER on 11/18. Pt requests 4mg dose, she felt 8 mg dose was too strong. Please approve or deny pending Rx.

## 2019-03-19 ENCOUNTER — TELEPHONE (OUTPATIENT)
Dept: FAMILY MEDICINE CLINIC | Facility: CLINIC | Age: 54
End: 2019-03-19

## 2019-03-19 NOTE — TELEPHONE ENCOUNTER
Patient called and was looking to speak with Rustam Lopez, she has some questions regarding Palliative Care,

## 2019-03-19 NOTE — TELEPHONE ENCOUNTER
Patient called and was looking to speak with Disha Day, she has some questions regarding Palliative Care, she is thinking of starting it but would like to discuss it first, see TE 3/14/19.

## 2019-04-04 RX ORDER — MIRTAZAPINE 15 MG/1
15 TABLET, FILM COATED ORAL NIGHTLY
Qty: 90 TABLET | Refills: 1 | Status: SHIPPED | OUTPATIENT
Start: 2019-04-04 | End: 2019-01-01 | Stop reason: DRUGHIGH

## 2019-04-04 NOTE — TELEPHONE ENCOUNTER
I spoke with pt to see how she was doing, she states she is doing much better. She is on Medical Marijuana now & that is helping the pain, nausea & increased her appetite. The Mirtazapine is helping her sleep so she is not crying all the time anymore.   P

## 2019-04-12 ENCOUNTER — TELEPHONE (OUTPATIENT)
Dept: HEMATOLOGY/ONCOLOGY | Facility: HOSPITAL | Age: 54
End: 2019-04-12

## 2019-04-12 NOTE — TELEPHONE ENCOUNTER
Pt called stating she needs to have a CT done but needs authorization for insurance. Called pt back and left VM stating she will need to schedule the CT and then our billers will get a work que to work on the authorization.  Let her know it may take a few d

## 2019-05-02 ENCOUNTER — HOSPITAL ENCOUNTER (OUTPATIENT)
Dept: CT IMAGING | Age: 54
Discharge: HOME | End: 2019-05-02
Attending: INTERNAL MEDICINE
Payer: COMMERCIAL

## 2019-05-02 DIAGNOSIS — C50.919 METASTATIC BREAST CANCER (HCC): ICD-10-CM

## 2019-05-02 DIAGNOSIS — R10.9 ABDOMINAL PAIN, UNSPECIFIED ABDOMINAL LOCATION: ICD-10-CM

## 2019-05-02 DIAGNOSIS — C79.51 BONE METASTASES (HCC): ICD-10-CM

## 2019-05-02 PROCEDURE — 74177 CT ABD & PELVIS W/CONTRAST: CPT | Performed by: INTERNAL MEDICINE

## 2019-05-02 PROCEDURE — 71260 CT THORAX DX C+: CPT | Performed by: INTERNAL MEDICINE

## 2019-05-02 PROCEDURE — 82565 ASSAY OF CREATININE: CPT

## 2019-05-03 ENCOUNTER — TELEPHONE (OUTPATIENT)
Dept: HEMATOLOGY/ONCOLOGY | Facility: HOSPITAL | Age: 54
End: 2019-05-03

## 2019-05-03 NOTE — TELEPHONE ENCOUNTER
Spoke to pt about CT. No visceral dis. Bone mets worse. Kei bone scan which was ordered but she has not done. Kei labs. She is feeling much better as on medical marijuana. Discussed AIs again, she refused. Will do labs, bone scan and f/u prn.  Encourage h

## 2019-05-08 ENCOUNTER — TELEPHONE (OUTPATIENT)
Dept: HEMATOLOGY/ONCOLOGY | Facility: HOSPITAL | Age: 54
End: 2019-05-08

## 2019-05-08 NOTE — TELEPHONE ENCOUNTER
Spoke to MD, there is no way of knowing specifics unless a bone scan is done. Pt is okay to walk/ride a bike/light exercise is okay. Left VM with message above.

## 2019-05-08 NOTE — TELEPHONE ENCOUNTER
Pt asking where specifically the cancer got worse in her bones? Ribs, spine, etc. She is also asking if it is okay to walk and do exercise, which I will tell her that is fine.

## 2019-05-16 NOTE — TELEPHONE ENCOUNTER
Pt is wondering if she should take all her meds at the same time or separate. She is also wondering how she should work he way way up on some medications she's stopped taking and is starting back up. Complex Repair And Flap Additional Text (Will Appearing After The Standard Complex Repair Text): The complex repair was not sufficient to completely close the primary defect. The remaining additional defect was repaired with the flap mentioned below.

## 2019-05-22 NOTE — TELEPHONE ENCOUNTER
Patient would like to know if she should continue to take Esomeprazole Magnesium 40 MG Oral Capsule Delayed Release ( she thought this was the medication) every day for her stomach. She was told by her pain doc that the dose might be too high for her.

## 2019-05-22 NOTE — TELEPHONE ENCOUNTER
Pt is asking if she should contain Nexium 40mg daily for her stomach. She states her pain specialist was concerned that was too high of a dose for her now. Her stomach/nausea is doing better with medical Marijuana.     LOV 12/7/18 Please advise on Nexium

## 2019-05-29 NOTE — TELEPHONE ENCOUNTER
I spoke with pt, she states she would like to stay on her current dose of Esomeprazole Magnesium, her reflux gets too bad when she does not take it. OK to continue current dose.   Refill sent per pt's request.  All questions answered, pt expresses Giovana

## 2019-07-08 NOTE — TELEPHONE ENCOUNTER
Medication(s) to Refill:   Requested Prescriptions     Pending Prescriptions Disp Refills   • diazepam 2 MG Oral Tab 60 tablet 0     Sig: Take 1 tablet (2 mg total) by mouth every 8 (eight) hours as needed for Anxiety.          Reason for Medication Refill

## 2019-07-08 NOTE — TELEPHONE ENCOUNTER
Called pt and informed her of need for OV. Pt scheduled as below. Pt states she has not seen PCP since her cancer diagnosis and would be happy to see her. FYI: Pt scheduled with you 7/15/2019 at 12:40 PM (20 min).

## 2019-07-08 NOTE — TELEPHONE ENCOUNTER
Called pt who states she had labs from her 's employer faxed to our office within the last hour and wanted to be sure Dr. Yuliya Leon received them. Pt concerned about WBC & elevated cholesterol. Pt placed on brief hold.  I was able to locate labs and

## 2019-07-15 NOTE — PROGRESS NOTES
Patient presents with:  Lab Results: discuss    HPI:    Fatigue last few months, worse with physical activities and better with res, moderate, worsening. H/o anemia and metastatic breast cancer, pt has it in the bones, no other organs, feels well otherwis Heart Disorder Mother    • Other (Other[other]) Mother    • Other (mi[other]) Mother       Social History    Tobacco Use      Smoking status: Never Smoker      Smokeless tobacco: Never Used    Alcohol use: No    Drug use: No        ROS:  GEN: no weight los

## 2019-07-16 NOTE — TELEPHONE ENCOUNTER
MD Ella Rivers MD; [facility] Clinical Staff 26 minutes ago (3:34 PM)     Iron level is normal. No harm to giving oral iron but may not help. Anemia likely due to bone marrow involvement with metastatic carcinoma.      Ольга Stein

## 2019-07-16 NOTE — TELEPHONE ENCOUNTER
Since you have routed Dr. Mahendra Harding comment, to confirm:     Pt does not need any oral IRON supplementation at this time? Please advise. Thank you!

## 2019-07-16 NOTE — TELEPHONE ENCOUNTER
Patient saw her labs and wanted to know if Dr. Sherrill Vences going to prescribed her iron pills or to get something over the counter. Please advise.

## 2019-07-16 NOTE — TELEPHONE ENCOUNTER
Please advise on IRON & TIBC results from 07/15/19. *pt also has CBC from Dr. Pepito Finley (low HGB). Pt would like to know if she should be treated for iron deficiency either Rx or OTC. Please advise. Thank you.

## 2019-07-16 NOTE — TELEPHONE ENCOUNTER
Pt notified that PCP with be consulting with Dr. Jessica Kuhn regarding labs, informed pt that we would contact her once instruction is received. Pt notified to contact the office if any further questions/concerns arise.

## 2019-08-22 NOTE — TELEPHONE ENCOUNTER
From: Chris Eastman  To: Rhys Mayes MD  Sent: 8/22/2019 2:17 AM CDT  Subject: Prescription Question    My meds for sleep are letting me sleep about two hours if I'm mitali. I know sleep aids are not always helpful. This one helped for a bit.  I'm glad

## 2019-08-22 NOTE — TELEPHONE ENCOUNTER
Rx sent to pharmacy. Called and left detailed message of below on pt's vm. Ok per [employer]. pt advised to call the office back with any questions. Advancement Flap (Double) Text: The defect edges were debeveled with a #15 scalpel blade.  Given the location of the defect and the proximity to free margins a double advancement flap was deemed most appropriate.  Using a sterile surgical marker, the appropriate advancement flaps were drawn incorporating the defect and placing the expected incisions within the relaxed skin tension lines where possible.    The area thus outlined was incised deep to adipose tissue with a #15 scalpel blade.  The skin margins were undermined to an appropriate distance in all directions utilizing iris scissors.

## 2019-08-30 NOTE — TELEPHONE ENCOUNTER
From: Elva Henry  To: Christal Lutz MD  Sent: 8/30/2019 7:12 AM CDT  Subject: Other    Arciniega morning! Can I get my sleep meds refilled at the 30 milligram dose? It works much better. Thanks! Also I heard your getting . Congratulations.  Best Re

## 2019-09-18 NOTE — TELEPHONE ENCOUNTER
Please see pt e-mail & advise on orders for pt. Below is the message from Dr. Valentine Roy to pt. Melva Roper MD     9:13 AM   Kun Soria, for antibiotics I would refer you to your PCP.  However if lymph node is not better by next week, let me know and also

## 2019-09-18 NOTE — TELEPHONE ENCOUNTER
Regarding: Other  Contact: [phone]  ----- Message from Ernesto Hager RN sent at 9/18/2019 11:06 AM CDT -----       ----- Message from Andre Jang to Gaudencio Tamayo MD sent at 9/18/2019 10:54 AM -----   Good morning, I spoke with Dr. Raissa Bates.  Adore olivier

## 2019-09-18 NOTE — TELEPHONE ENCOUNTER
From: Thais Cranker  To: Christopher Walsh MD  Sent: 9/18/2019 10:54 AM CDT  Subject: Other    Good morning, I spoke with Dr. Livia Coello. She said I need to talk to you about an antibiotic for my swollen lump node.  Sidney Rausch

## 2019-09-21 NOTE — TELEPHONE ENCOUNTER
Medication(s) to Refill:   Requested Prescriptions     Pending Prescriptions Disp Refills   • MIRTAZAPINE 15 MG Oral Tab [Pharmacy Med Name: MIRTAZAPINE 15 MG TABLET] 90 tablet 1     Sig: TAKE 1 TABLET BY MOUTH EVERY DAY AT NIGHT           Last Time [employer]

## 2019-10-03 NOTE — ED INITIAL ASSESSMENT (HPI)
Pt presented to ER c/o right shoulder pain for the past 3 weeks. Pt denies injury.  Pt states she has Mets to the bones from breast cancer

## 2019-10-03 NOTE — TELEPHONE ENCOUNTER
Medication(s) to Refill:   Requested Prescriptions     Pending Prescriptions Disp Refills   • DIAZEPAM 2 MG Oral Tab [Pharmacy Med Name: DIAZEPAM 2 MG TABLET] 60 tablet 0     Sig: TAKE 1 TABLET BY MOUTH EVERY 8 HOURS AS NEEDED FOR ANXIETY     Script called

## 2019-10-03 NOTE — TELEPHONE ENCOUNTER
From: Lolly Ram  To: Gaudencio Tamayo MD  Sent: 10/2/2019 8:19 PM CDT  Subject: Prescription Question    Hi Dr. oMmo Felix, I received a call from your office regarding the refill of the diazapam. They said it was ready,but the pharmacy said it hasn't

## 2019-10-03 NOTE — ED PROVIDER NOTES
Patient Seen in: [facility] Emergency Department In [place]      History   Patient presents with:  Upper Extremity Injury (musculoskeletal)    Stated Complaint: right shoulder pain    HPI    26-year-old female with metastatic breast cancer with mets to th 97.3 °F (36.3 °C)   Temp src Temporal   SpO2 100 %   O2 Device None (Room air)       Current:/60   Pulse 68   Temp 97.3 °F (36.3 °C) (Temporal)   Resp 16   Ht 153 cm (5' 0.24\")   Wt 52.2 kg   SpO2 100%   BMI 22.28 kg/m²         Physical Exam   Const way, Return to the ER if you have any concerns        Medications Prescribed:  Discharge Medication List as of 10/3/2019  2:23 AM

## 2019-11-21 NOTE — TELEPHONE ENCOUNTER
From: Dwayne Don  To: Maciej Tate MD  Sent: 11/21/2019 1:57 AM CST  Subject: Other    Dear Dr. Maria Dolores Singleton., Good morning! The prescription for sleeping is not working at all. I'm at the end of the prescription. Is there something else? I'm exhausted.  I hope

## 2019-12-04 NOTE — TELEPHONE ENCOUNTER
From: Gerry Nixon  To:  Mariely Cox MD  Sent: 12/2/2019 6:29 PM CST  Subject: Prescription Question    The pharmacy has not received the refill for Diazepam.

## 2020-01-01 ENCOUNTER — HOSPITAL ENCOUNTER (INPATIENT)
Facility: HOSPITAL | Age: 55
LOS: 5 days | Discharge: LEFT BEFORE DISCHARGE | DRG: 683 | End: 2020-01-01
Attending: EMERGENCY MEDICINE | Admitting: HOSPITALIST
Payer: COMMERCIAL

## 2020-01-01 ENCOUNTER — OFFICE VISIT (OUTPATIENT)
Dept: HEMATOLOGY/ONCOLOGY | Age: 55
End: 2020-01-01
Attending: NURSE PRACTITIONER
Payer: COMMERCIAL

## 2020-01-01 ENCOUNTER — HOSPITAL ENCOUNTER (EMERGENCY)
Age: 55
Discharge: HOME | End: 2020-01-01
Attending: EMERGENCY MEDICINE
Payer: COMMERCIAL

## 2020-01-01 ENCOUNTER — APPOINTMENT (OUTPATIENT)
Dept: GENERAL RADIOLOGY | Age: 55
DRG: 683 | End: 2020-01-01
Attending: EMERGENCY MEDICINE
Payer: COMMERCIAL

## 2020-01-01 ENCOUNTER — TELEPHONE (OUTPATIENT)
Dept: FAMILY MEDICINE CLINIC | Facility: CLINIC | Age: 55
End: 2020-01-01

## 2020-01-01 ENCOUNTER — TELEPHONE (OUTPATIENT)
Dept: HEMATOLOGY/ONCOLOGY | Facility: HOSPITAL | Age: 55
End: 2020-01-01

## 2020-01-01 ENCOUNTER — PATIENT OUTREACH (OUTPATIENT)
Dept: CASE MANAGEMENT | Age: 55
End: 2020-01-01

## 2020-01-01 ENCOUNTER — APPOINTMENT (OUTPATIENT)
Dept: CT IMAGING | Age: 55
DRG: 683 | End: 2020-01-01
Attending: EMERGENCY MEDICINE
Payer: COMMERCIAL

## 2020-01-01 ENCOUNTER — PATIENT MESSAGE (OUTPATIENT)
Dept: HEMATOLOGY/ONCOLOGY | Age: 55
End: 2020-01-01

## 2020-01-01 ENCOUNTER — NURSE NAVIGATOR ENCOUNTER (OUTPATIENT)
Dept: HEMATOLOGY/ONCOLOGY | Facility: HOSPITAL | Age: 55
End: 2020-01-01

## 2020-01-01 ENCOUNTER — SOCIAL WORK SERVICES (OUTPATIENT)
Dept: HEMATOLOGY/ONCOLOGY | Facility: HOSPITAL | Age: 55
End: 2020-01-01

## 2020-01-01 VITALS
WEIGHT: 112.63 LBS | RESPIRATION RATE: 16 BRPM | BODY MASS INDEX: 22 KG/M2 | TEMPERATURE: 99 F | HEART RATE: 103 BPM | DIASTOLIC BLOOD PRESSURE: 80 MMHG | SYSTOLIC BLOOD PRESSURE: 154 MMHG | OXYGEN SATURATION: 100 %

## 2020-01-01 VITALS
RESPIRATION RATE: 20 BRPM | HEIGHT: 60 IN | WEIGHT: 105 LBS | HEART RATE: 88 BPM | BODY MASS INDEX: 20.62 KG/M2 | SYSTOLIC BLOOD PRESSURE: 131 MMHG | OXYGEN SATURATION: 98 % | DIASTOLIC BLOOD PRESSURE: 99 MMHG | TEMPERATURE: 99 F

## 2020-01-01 VITALS
WEIGHT: 110.25 LBS | SYSTOLIC BLOOD PRESSURE: 154 MMHG | RESPIRATION RATE: 18 BRPM | BODY MASS INDEX: 22.23 KG/M2 | OXYGEN SATURATION: 95 % | TEMPERATURE: 99 F | HEIGHT: 59 IN | HEART RATE: 118 BPM | DIASTOLIC BLOOD PRESSURE: 82 MMHG

## 2020-01-01 DIAGNOSIS — R52 INTRACTABLE PAIN: ICD-10-CM

## 2020-01-01 DIAGNOSIS — G89.3 NEOPLASM RELATED PAIN: Primary | ICD-10-CM

## 2020-01-01 DIAGNOSIS — F41.9 ANXIETY: ICD-10-CM

## 2020-01-01 DIAGNOSIS — E83.52 HYPERCALCEMIA: ICD-10-CM

## 2020-01-01 DIAGNOSIS — C79.51 BONE METASTASES (HCC): ICD-10-CM

## 2020-01-01 DIAGNOSIS — R11.2 INTRACTABLE VOMITING WITH NAUSEA: ICD-10-CM

## 2020-01-01 DIAGNOSIS — R10.9 CHRONIC ABDOMINAL PAIN: ICD-10-CM

## 2020-01-01 DIAGNOSIS — C50.919 MALIGNANT NEOPLASM OF FEMALE BREAST, UNSPECIFIED ESTROGEN RECEPTOR STATUS, UNSPECIFIED LATERALITY, UNSPECIFIED SITE OF BREAST (HCC): Primary | ICD-10-CM

## 2020-01-01 DIAGNOSIS — R19.7 NAUSEA VOMITING AND DIARRHEA: Primary | ICD-10-CM

## 2020-01-01 DIAGNOSIS — G89.29 CHRONIC ABDOMINAL PAIN: ICD-10-CM

## 2020-01-01 DIAGNOSIS — E87.6 HYPOKALEMIA: ICD-10-CM

## 2020-01-01 DIAGNOSIS — G89.3 NEOPLASM RELATED PAIN: ICD-10-CM

## 2020-01-01 DIAGNOSIS — K59.03 DRUG-INDUCED CONSTIPATION: ICD-10-CM

## 2020-01-01 DIAGNOSIS — R11.2 NAUSEA VOMITING AND DIARRHEA: Primary | ICD-10-CM

## 2020-01-01 DIAGNOSIS — Z51.5 PALLIATIVE CARE ENCOUNTER: ICD-10-CM

## 2020-01-01 LAB
ALBUMIN SERPL-MCNC: 2.6 G/DL (ref 3.4–5)
ALBUMIN SERPL-MCNC: 2.8 G/DL (ref 3.4–5)
ALBUMIN SERPL-MCNC: 3.5 G/DL (ref 3.4–5)
ALBUMIN SERPL-MCNC: 3.6 G/DL (ref 3.4–5)
ALBUMIN/GLOB SERPL: 0.6 {RATIO} (ref 1–2)
ALBUMIN/GLOB SERPL: 0.7 {RATIO} (ref 1–2)
ALP LIVER SERPL-CCNC: 109 U/L (ref 41–108)
ALP LIVER SERPL-CCNC: 117 U/L (ref 41–108)
ALP LIVER SERPL-CCNC: 141 U/L (ref 41–108)
ALP LIVER SERPL-CCNC: 98 U/L (ref 41–108)
ALT SERPL-CCNC: 18 U/L (ref 13–56)
ALT SERPL-CCNC: 22 U/L (ref 13–56)
ALT SERPL-CCNC: 27 U/L (ref 13–56)
ALT SERPL-CCNC: 36 U/L (ref 13–56)
ANION GAP SERPL CALC-SCNC: 11 MMOL/L (ref 0–18)
ANION GAP SERPL CALC-SCNC: 13 MMOL/L (ref 0–18)
ANION GAP SERPL CALC-SCNC: 8 MMOL/L (ref 0–18)
ANION GAP SERPL CALC-SCNC: 8 MMOL/L (ref 0–18)
ANION GAP SERPL CALC-SCNC: 9 MMOL/L (ref 0–18)
AST SERPL-CCNC: 49 U/L (ref 15–37)
AST SERPL-CCNC: 49 U/L (ref 15–37)
AST SERPL-CCNC: 53 U/L (ref 15–37)
AST SERPL-CCNC: 71 U/L (ref 15–37)
ATRIAL RATE: 96 BPM
BASOPHILS # BLD AUTO: 0.01 X10(3) UL (ref 0–0.2)
BASOPHILS # BLD AUTO: 0.02 X10(3) UL (ref 0–0.2)
BASOPHILS # BLD AUTO: 0.02 X10(3) UL (ref 0–0.2)
BASOPHILS NFR BLD AUTO: 0.2 %
BASOPHILS NFR BLD AUTO: 0.3 %
BASOPHILS NFR BLD AUTO: 0.3 %
BILIRUB SERPL-MCNC: 0.4 MG/DL (ref 0.1–2)
BILIRUB SERPL-MCNC: 0.4 MG/DL (ref 0.1–2)
BILIRUB SERPL-MCNC: 0.5 MG/DL (ref 0.1–2)
BILIRUB SERPL-MCNC: 0.7 MG/DL (ref 0.1–2)
BILIRUB UR QL STRIP.AUTO: NEGATIVE
BILIRUB UR QL STRIP.AUTO: NEGATIVE
BUN BLD-MCNC: 12 MG/DL (ref 7–18)
BUN BLD-MCNC: 14 MG/DL (ref 7–18)
BUN BLD-MCNC: 19 MG/DL (ref 7–18)
BUN BLD-MCNC: 22 MG/DL (ref 7–18)
BUN BLD-MCNC: 26 MG/DL (ref 7–18)
BUN/CREAT SERPL: 13.7 (ref 10–20)
BUN/CREAT SERPL: 14.2 (ref 10–20)
BUN/CREAT SERPL: 14.2 (ref 10–20)
BUN/CREAT SERPL: 14.6 (ref 10–20)
BUN/CREAT SERPL: 17.1 (ref 10–20)
CALCIUM BLD-MCNC: 11.4 MG/DL (ref 8.5–10.1)
CALCIUM BLD-MCNC: 12.8 MG/DL (ref 8.5–10.1)
CALCIUM BLD-MCNC: 14.7 MG/DL (ref 8.5–10.1)
CALCIUM BLD-MCNC: 9.4 MG/DL (ref 8.5–10.1)
CALCIUM BLD-MCNC: 9.9 MG/DL (ref 8.5–10.1)
CHLORIDE SERPL-SCNC: 104 MMOL/L (ref 98–112)
CHLORIDE SERPL-SCNC: 107 MMOL/L (ref 98–112)
CHLORIDE SERPL-SCNC: 110 MMOL/L (ref 98–112)
CHLORIDE SERPL-SCNC: 111 MMOL/L (ref 98–112)
CHLORIDE SERPL-SCNC: 97 MMOL/L (ref 98–112)
CLARITY UR REFRACT.AUTO: CLEAR
CO2 SERPL-SCNC: 19 MMOL/L (ref 21–32)
CO2 SERPL-SCNC: 22 MMOL/L (ref 21–32)
CO2 SERPL-SCNC: 23 MMOL/L (ref 21–32)
CO2 SERPL-SCNC: 24 MMOL/L (ref 21–32)
CO2 SERPL-SCNC: 30 MMOL/L (ref 21–32)
COLOR UR AUTO: YELLOW
COLOR UR AUTO: YELLOW
CREAT BLD-MCNC: 0.7 MG/DL (ref 0.55–1.02)
CREAT BLD-MCNC: 0.96 MG/DL (ref 0.55–1.02)
CREAT BLD-MCNC: 1.39 MG/DL (ref 0.55–1.02)
CREAT BLD-MCNC: 1.55 MG/DL (ref 0.55–1.02)
CREAT BLD-MCNC: 1.83 MG/DL (ref 0.55–1.02)
DEPRECATED HBV CORE AB SER IA-ACNC: 1307.7 NG/ML (ref 18–340)
DEPRECATED RDW RBC AUTO: 65.6 FL (ref 35.1–46.3)
DEPRECATED RDW RBC AUTO: 71.7 FL (ref 35.1–46.3)
DEPRECATED RDW RBC AUTO: 71.7 FL (ref 35.1–46.3)
EOSINOPHIL # BLD AUTO: 0.02 X10(3) UL (ref 0–0.7)
EOSINOPHIL # BLD AUTO: 0.05 X10(3) UL (ref 0–0.7)
EOSINOPHIL # BLD AUTO: 0.06 X10(3) UL (ref 0–0.7)
EOSINOPHIL NFR BLD AUTO: 0.3 %
EOSINOPHIL NFR BLD AUTO: 1 %
EOSINOPHIL NFR BLD AUTO: 1.2 %
ERYTHROCYTE [DISTWIDTH] IN BLOOD BY AUTOMATED COUNT: 19.4 % (ref 11–15)
ERYTHROCYTE [DISTWIDTH] IN BLOOD BY AUTOMATED COUNT: 19.6 % (ref 11–15)
ERYTHROCYTE [DISTWIDTH] IN BLOOD BY AUTOMATED COUNT: 19.6 % (ref 11–15)
GLOBULIN PLAS-MCNC: 4.1 G/DL (ref 2.8–4.4)
GLOBULIN PLAS-MCNC: 4.2 G/DL (ref 2.8–4.4)
GLOBULIN PLAS-MCNC: 5.1 G/DL (ref 2.8–4.4)
GLOBULIN PLAS-MCNC: 5.3 G/DL (ref 2.8–4.4)
GLUCOSE BLD-MCNC: 115 MG/DL (ref 70–99)
GLUCOSE BLD-MCNC: 129 MG/DL (ref 70–99)
GLUCOSE BLD-MCNC: 68 MG/DL (ref 70–99)
GLUCOSE BLD-MCNC: 69 MG/DL (ref 70–99)
GLUCOSE BLD-MCNC: 71 MG/DL (ref 70–99)
GLUCOSE BLD-MCNC: 81 MG/DL (ref 70–99)
GLUCOSE BLD-MCNC: 89 MG/DL (ref 70–99)
GLUCOSE BLD-MCNC: 89 MG/DL (ref 70–99)
GLUCOSE UR STRIP.AUTO-MCNC: NEGATIVE MG/DL
GLUCOSE UR STRIP.AUTO-MCNC: NEGATIVE MG/DL
GRAN CASTS #/AREA URNS LPF: PRESENT /LPF
HCT VFR BLD AUTO: 26.7 % (ref 35–48)
HCT VFR BLD AUTO: 27.5 % (ref 35–48)
HCT VFR BLD AUTO: 32.2 % (ref 35–48)
HGB BLD-MCNC: 10.1 G/DL (ref 12–16)
HGB BLD-MCNC: 8.3 G/DL (ref 12–16)
HGB BLD-MCNC: 8.7 G/DL (ref 12–16)
HGB RETIC QN AUTO: 40.6 PG (ref 28.2–36.6)
HYALINE CASTS #/AREA URNS AUTO: PRESENT /LPF
IMM GRANULOCYTES # BLD AUTO: 0.07 X10(3) UL (ref 0–1)
IMM GRANULOCYTES # BLD AUTO: 0.09 X10(3) UL (ref 0–1)
IMM GRANULOCYTES # BLD AUTO: 0.12 X10(3) UL (ref 0–1)
IMM GRANULOCYTES NFR BLD: 1.6 %
IMM GRANULOCYTES NFR BLD: 1.7 %
IMM GRANULOCYTES NFR BLD: 1.9 %
IMM RETICS NFR: 0.28 RATIO (ref 0.1–0.3)
IRON SATURATION: 36 % (ref 15–50)
IRON SERPL-MCNC: 78 UG/DL (ref 50–170)
KETONES UR STRIP.AUTO-MCNC: 15 MG/DL
LDH SERPL L TO P-CCNC: 135 U/L (ref 84–246)
LIPASE SERPL-CCNC: 258 U/L (ref 73–393)
LIPASE SERPL-CCNC: 72 U/L (ref 73–393)
LYMPHOCYTES # BLD AUTO: 1.62 X10(3) UL (ref 1–4)
LYMPHOCYTES # BLD AUTO: 1.88 X10(3) UL (ref 1–4)
LYMPHOCYTES # BLD AUTO: 2 X10(3) UL (ref 1–4)
LYMPHOCYTES NFR BLD AUTO: 30.9 %
LYMPHOCYTES NFR BLD AUTO: 32.7 %
LYMPHOCYTES NFR BLD AUTO: 38.8 %
M PROTEIN MFR SERPL ELPH: 6.7 G/DL (ref 6.4–8.2)
M PROTEIN MFR SERPL ELPH: 7 G/DL (ref 6.4–8.2)
M PROTEIN MFR SERPL ELPH: 8.7 G/DL (ref 6.4–8.2)
M PROTEIN MFR SERPL ELPH: 8.8 G/DL (ref 6.4–8.2)
MCH RBC QN AUTO: 29.4 PG (ref 26–34)
MCH RBC QN AUTO: 30.8 PG (ref 26–34)
MCH RBC QN AUTO: 31 PG (ref 26–34)
MCHC RBC AUTO-ENTMCNC: 31.1 G/DL (ref 31–37)
MCHC RBC AUTO-ENTMCNC: 31.4 G/DL (ref 31–37)
MCHC RBC AUTO-ENTMCNC: 31.6 G/DL (ref 31–37)
MCV RBC AUTO: 92.9 FL (ref 80–100)
MCV RBC AUTO: 98.2 FL (ref 80–100)
MCV RBC AUTO: 99.6 FL (ref 80–100)
MONOCYTES # BLD AUTO: 0.34 X10(3) UL (ref 0.1–1)
MONOCYTES # BLD AUTO: 0.41 X10(3) UL (ref 0.1–1)
MONOCYTES # BLD AUTO: 0.61 X10(3) UL (ref 0.1–1)
MONOCYTES NFR BLD AUTO: 10.6 %
MONOCYTES NFR BLD AUTO: 6.3 %
MONOCYTES NFR BLD AUTO: 8.2 %
NEUTROPHILS # BLD AUTO: 2.08 X10 (3) UL (ref 1.5–7.7)
NEUTROPHILS # BLD AUTO: 2.08 X10(3) UL (ref 1.5–7.7)
NEUTROPHILS # BLD AUTO: 3.09 X10 (3) UL (ref 1.5–7.7)
NEUTROPHILS # BLD AUTO: 3.09 X10(3) UL (ref 1.5–7.7)
NEUTROPHILS # BLD AUTO: 3.91 X10 (3) UL (ref 1.5–7.7)
NEUTROPHILS # BLD AUTO: 3.91 X10(3) UL (ref 1.5–7.7)
NEUTROPHILS NFR BLD AUTO: 49.9 %
NEUTROPHILS NFR BLD AUTO: 53.8 %
NEUTROPHILS NFR BLD AUTO: 60.3 %
NITRITE UR QL STRIP.AUTO: NEGATIVE
NITRITE UR QL STRIP.AUTO: NEGATIVE
OSMOLALITY SERPL CALC.SUM OF ELEC: 286 MOSM/KG (ref 275–295)
OSMOLALITY SERPL CALC.SUM OF ELEC: 287 MOSM/KG (ref 275–295)
OSMOLALITY SERPL CALC.SUM OF ELEC: 290 MOSM/KG (ref 275–295)
OSMOLALITY SERPL CALC.SUM OF ELEC: 294 MOSM/KG (ref 275–295)
OSMOLALITY SERPL CALC.SUM OF ELEC: 294 MOSM/KG (ref 275–295)
P AXIS: 38 DEGREES
P-R INTERVAL: 126 MS
PH UR STRIP.AUTO: 5 [PH] (ref 4.5–8)
PH UR STRIP.AUTO: 6 [PH] (ref 4.5–8)
PLATELET # BLD AUTO: 148 10(3)UL (ref 150–450)
PLATELET # BLD AUTO: 166 10(3)UL (ref 150–450)
PLATELET # BLD AUTO: 192 10(3)UL (ref 150–450)
PLATELET MORPHOLOGY: NORMAL
PLATELET MORPHOLOGY: NORMAL
POTASSIUM SERPL-SCNC: 2.9 MMOL/L (ref 3.5–5.1)
POTASSIUM SERPL-SCNC: 2.9 MMOL/L (ref 3.5–5.1)
POTASSIUM SERPL-SCNC: 3.3 MMOL/L (ref 3.5–5.1)
POTASSIUM SERPL-SCNC: 3.4 MMOL/L (ref 3.5–5.1)
POTASSIUM SERPL-SCNC: 3.5 MMOL/L (ref 3.5–5.1)
POTASSIUM SERPL-SCNC: 3.5 MMOL/L (ref 3.5–5.1)
POTASSIUM SERPL-SCNC: 3.7 MMOL/L (ref 3.5–5.1)
PROT UR STRIP.AUTO-MCNC: NEGATIVE MG/DL
Q-T INTERVAL: 340 MS
QRS DURATION: 88 MS
QTC CALCULATION (BEZET): 429 MS
R AXIS: 30 DEGREES
RBC # BLD AUTO: 2.68 X10(6)UL (ref 3.8–5.3)
RBC # BLD AUTO: 2.96 X10(6)UL (ref 3.8–5.3)
RBC # BLD AUTO: 3.28 X10(6)UL (ref 3.8–5.3)
RBC UR QL AUTO: NEGATIVE
RETICS # AUTO: 41.7 X10(3) UL (ref 22.5–147.5)
RETICS/RBC NFR AUTO: 1.6 % (ref 0.5–2.5)
ROULEAUX BLD QL SMEAR: PRESENT
SODIUM SERPL-SCNC: 136 MMOL/L (ref 136–145)
SODIUM SERPL-SCNC: 139 MMOL/L (ref 136–145)
SODIUM SERPL-SCNC: 139 MMOL/L (ref 136–145)
SODIUM SERPL-SCNC: 141 MMOL/L (ref 136–145)
SODIUM SERPL-SCNC: 141 MMOL/L (ref 136–145)
SP GR UR STRIP.AUTO: 1.01 (ref 1–1.03)
SP GR UR STRIP.AUTO: 1.02 (ref 1–1.03)
T AXIS: -8 DEGREES
T4 FREE SERPL-MCNC: 1 NG/DL (ref 0.8–1.7)
TOTAL IRON BINDING CAPACITY: 219 UG/DL (ref 240–450)
TRANSFERRIN SERPL-MCNC: 147 MG/DL (ref 200–360)
TSI SER-ACNC: 1.22 MIU/ML (ref 0.36–3.74)
UROBILINOGEN UR STRIP.AUTO-MCNC: 0.2 MG/DL
UROBILINOGEN UR STRIP.AUTO-MCNC: 0.2 MG/DL
VENTRICULAR RATE: 96 BPM
WBC # BLD AUTO: 4.2 X10(3) UL (ref 4–11)
WBC # BLD AUTO: 5.8 X10(3) UL (ref 4–11)
WBC # BLD AUTO: 6.5 X10(3) UL (ref 4–11)

## 2020-01-01 PROCEDURE — 99255 IP/OBS CONSLTJ NEW/EST HI 80: CPT | Performed by: INTERNAL MEDICINE

## 2020-01-01 PROCEDURE — 96374 THER/PROPH/DIAG INJ IV PUSH: CPT | Performed by: EMERGENCY MEDICINE

## 2020-01-01 PROCEDURE — 99232 SBSQ HOSP IP/OBS MODERATE 35: CPT | Performed by: INTERNAL MEDICINE

## 2020-01-01 PROCEDURE — 99284 EMERGENCY DEPT VISIT MOD MDM: CPT | Performed by: EMERGENCY MEDICINE

## 2020-01-01 PROCEDURE — 99253 IP/OBS CNSLTJ NEW/EST LOW 45: CPT | Performed by: CLINICAL NURSE SPECIALIST

## 2020-01-01 PROCEDURE — 81001 URINALYSIS AUTO W/SCOPE: CPT | Performed by: EMERGENCY MEDICINE

## 2020-01-01 PROCEDURE — 99233 SBSQ HOSP IP/OBS HIGH 50: CPT | Performed by: CLINICAL NURSE SPECIALIST

## 2020-01-01 PROCEDURE — 72110 X-RAY EXAM L-2 SPINE 4/>VWS: CPT | Performed by: EMERGENCY MEDICINE

## 2020-01-01 PROCEDURE — 99223 1ST HOSP IP/OBS HIGH 75: CPT | Performed by: INTERNAL MEDICINE

## 2020-01-01 PROCEDURE — 99233 SBSQ HOSP IP/OBS HIGH 50: CPT | Performed by: INTERNAL MEDICINE

## 2020-01-01 PROCEDURE — 99356 PROLONGED SERV,INPATIENT,1ST HR: CPT | Performed by: CLINICAL NURSE SPECIALIST

## 2020-01-01 PROCEDURE — 85025 COMPLETE CBC W/AUTO DIFF WBC: CPT | Performed by: EMERGENCY MEDICINE

## 2020-01-01 PROCEDURE — 74177 CT ABD & PELVIS W/CONTRAST: CPT | Performed by: EMERGENCY MEDICINE

## 2020-01-01 PROCEDURE — 83690 ASSAY OF LIPASE: CPT | Performed by: EMERGENCY MEDICINE

## 2020-01-01 PROCEDURE — 80053 COMPREHEN METABOLIC PANEL: CPT | Performed by: EMERGENCY MEDICINE

## 2020-01-01 PROCEDURE — 99239 HOSP IP/OBS DSCHRG MGMT >30: CPT | Performed by: INTERNAL MEDICINE

## 2020-01-01 PROCEDURE — 96361 HYDRATE IV INFUSION ADD-ON: CPT | Performed by: EMERGENCY MEDICINE

## 2020-01-01 PROCEDURE — 99214 OFFICE O/P EST MOD 30 MIN: CPT | Performed by: NURSE PRACTITIONER

## 2020-01-01 PROCEDURE — 81015 MICROSCOPIC EXAM OF URINE: CPT | Performed by: EMERGENCY MEDICINE

## 2020-01-01 RX ORDER — POTASSIUM CHLORIDE 14.9 MG/ML
20 INJECTION INTRAVENOUS ONCE
Status: COMPLETED | OUTPATIENT
Start: 2020-01-01 | End: 2020-01-01

## 2020-01-01 RX ORDER — MORPHINE SULFATE 4 MG/ML
2 INJECTION, SOLUTION INTRAMUSCULAR; INTRAVENOUS EVERY 2 HOUR PRN
Status: CANCELLED | OUTPATIENT
Start: 2020-01-01

## 2020-01-01 RX ORDER — NAPROXEN 500 MG/1
500 TABLET ORAL 2 TIMES DAILY WITH MEALS
Qty: 60 TABLET | Refills: 0 | Status: SHIPPED | OUTPATIENT
Start: 2020-01-01 | End: 2020-01-01

## 2020-01-01 RX ORDER — CALCITONIN SALMON 200 [USP'U]/ML
4 INJECTION, SOLUTION INTRAMUSCULAR; SUBCUTANEOUS 2 TIMES DAILY
Status: DISCONTINUED | OUTPATIENT
Start: 2020-01-01 | End: 2020-01-01

## 2020-01-01 RX ORDER — SENNA AND DOCUSATE SODIUM 50; 8.6 MG/1; MG/1
2 TABLET, FILM COATED ORAL DAILY
Status: DISCONTINUED | OUTPATIENT
Start: 2020-01-01 | End: 2020-01-01

## 2020-01-01 RX ORDER — ONDANSETRON 4 MG/1
4 TABLET, ORALLY DISINTEGRATING ORAL EVERY 4 HOURS PRN
Qty: 10 TABLET | Refills: 0 | Status: SHIPPED | OUTPATIENT
Start: 2020-01-01 | End: 2020-01-01

## 2020-01-01 RX ORDER — DIPHENHYDRAMINE HYDROCHLORIDE 50 MG/ML
12.5 INJECTION INTRAMUSCULAR; INTRAVENOUS EVERY 4 HOURS PRN
Status: DISCONTINUED | OUTPATIENT
Start: 2020-01-01 | End: 2020-01-01

## 2020-01-01 RX ORDER — SENNOSIDES 8.6 MG
8.6 TABLET ORAL 2 TIMES DAILY
COMMUNITY
End: 2020-01-01

## 2020-01-01 RX ORDER — HEPARIN SODIUM 5000 [USP'U]/ML
5000 INJECTION, SOLUTION INTRAVENOUS; SUBCUTANEOUS EVERY 8 HOURS SCHEDULED
Status: DISCONTINUED | OUTPATIENT
Start: 2020-01-01 | End: 2020-01-01

## 2020-01-01 RX ORDER — ONDANSETRON 2 MG/ML
8 INJECTION INTRAMUSCULAR; INTRAVENOUS
Status: DISCONTINUED | OUTPATIENT
Start: 2020-01-01 | End: 2020-01-01

## 2020-01-01 RX ORDER — MORPHINE SULFATE 4 MG/ML
4 INJECTION, SOLUTION INTRAMUSCULAR; INTRAVENOUS EVERY 30 MIN PRN
Status: DISCONTINUED | OUTPATIENT
Start: 2020-01-01 | End: 2020-01-01

## 2020-01-01 RX ORDER — MORPHINE SULFATE 4 MG/ML
2 INJECTION, SOLUTION INTRAMUSCULAR; INTRAVENOUS EVERY 2 HOUR PRN
Status: DISCONTINUED | OUTPATIENT
Start: 2020-01-01 | End: 2020-01-01

## 2020-01-01 RX ORDER — POLYETHYLENE GLYCOL 3350 17 G/17G
17 POWDER, FOR SOLUTION ORAL DAILY
Qty: 1 BOTTLE | Refills: 0 | Status: SHIPPED | OUTPATIENT
Start: 2020-01-01

## 2020-01-01 RX ORDER — DIAZEPAM 5 MG/ML
2.5 INJECTION, SOLUTION INTRAMUSCULAR; INTRAVENOUS EVERY 6 HOURS PRN
Status: DISCONTINUED | OUTPATIENT
Start: 2020-01-01 | End: 2020-01-01

## 2020-01-01 RX ORDER — MAGNESIUM CARB/ALUMINUM HYDROX 105-160MG
296 TABLET,CHEWABLE ORAL ONCE
COMMUNITY
End: 2020-01-01

## 2020-01-01 RX ORDER — SODIUM CHLORIDE 9 MG/ML
INJECTION, SOLUTION INTRAVENOUS CONTINUOUS
Status: DISCONTINUED | OUTPATIENT
Start: 2020-01-01 | End: 2020-01-01

## 2020-01-01 RX ORDER — POLYETHYLENE GLYCOL 3350 17 G/17G
17 POWDER, FOR SOLUTION ORAL DAILY
COMMUNITY
End: 2020-01-01

## 2020-01-01 RX ORDER — MORPHINE SULFATE 4 MG/ML
4 INJECTION, SOLUTION INTRAMUSCULAR; INTRAVENOUS EVERY 2 HOUR PRN
Status: CANCELLED | OUTPATIENT
Start: 2020-01-01

## 2020-01-01 RX ORDER — POTASSIUM CHLORIDE 20 MEQ/1
40 TABLET, EXTENDED RELEASE ORAL ONCE
Status: COMPLETED | OUTPATIENT
Start: 2020-01-01 | End: 2020-01-01

## 2020-01-01 RX ORDER — POLYETHYLENE GLYCOL 3350 17 G/17G
17 POWDER, FOR SOLUTION ORAL DAILY
Status: DISCONTINUED | OUTPATIENT
Start: 2020-01-01 | End: 2020-01-01

## 2020-01-01 RX ORDER — POTASSIUM CHLORIDE 20 MEQ/1
40 TABLET, EXTENDED RELEASE ORAL EVERY 4 HOURS
Status: COMPLETED | OUTPATIENT
Start: 2020-01-01 | End: 2020-01-01

## 2020-01-01 RX ORDER — HALOPERIDOL 5 MG/ML
2 INJECTION INTRAMUSCULAR ONCE
Status: DISCONTINUED | OUTPATIENT
Start: 2020-01-01 | End: 2020-01-01

## 2020-01-01 RX ORDER — OLANZAPINE 2.5 MG/1
2.5 TABLET ORAL DAILY
Status: DISCONTINUED | OUTPATIENT
Start: 2020-01-01 | End: 2020-01-01

## 2020-01-01 RX ORDER — ONDANSETRON 2 MG/ML
4 INJECTION INTRAMUSCULAR; INTRAVENOUS ONCE
Status: COMPLETED | OUTPATIENT
Start: 2020-01-01 | End: 2020-01-01

## 2020-01-01 RX ORDER — HYDROCODONE BITARTRATE AND ACETAMINOPHEN 5; 325 MG/1; MG/1
1 TABLET ORAL EVERY 4 HOURS PRN
Qty: 90 TABLET | Refills: 0 | Status: SHIPPED | OUTPATIENT
Start: 2020-01-01

## 2020-01-01 RX ORDER — HYDROMORPHONE HYDROCHLORIDE 1 MG/ML
0.5 INJECTION, SOLUTION INTRAMUSCULAR; INTRAVENOUS; SUBCUTANEOUS EVERY 2 HOUR PRN
Status: DISCONTINUED | OUTPATIENT
Start: 2020-01-01 | End: 2020-01-01

## 2020-01-01 RX ORDER — NALBUPHINE HCL 10 MG/ML
2.5 AMPUL (ML) INJECTION EVERY 4 HOURS PRN
Status: DISCONTINUED | OUTPATIENT
Start: 2020-01-01 | End: 2020-01-01

## 2020-01-01 RX ORDER — SENNOSIDES 8.6 MG
8.6 TABLET ORAL 2 TIMES DAILY
Qty: 60 TABLET | Refills: 1 | Status: SHIPPED | OUTPATIENT
Start: 2020-01-01

## 2020-01-01 RX ORDER — HYDROMORPHONE HYDROCHLORIDE 1 MG/ML
1 INJECTION, SOLUTION INTRAMUSCULAR; INTRAVENOUS; SUBCUTANEOUS
Status: DISCONTINUED | OUTPATIENT
Start: 2020-01-01 | End: 2020-01-01

## 2020-01-01 RX ORDER — ONDANSETRON 2 MG/ML
4 INJECTION INTRAMUSCULAR; INTRAVENOUS EVERY 6 HOURS PRN
Status: DISCONTINUED | OUTPATIENT
Start: 2020-01-01 | End: 2020-01-01

## 2020-01-01 RX ORDER — DEXAMETHASONE SODIUM PHOSPHATE 10 MG/ML
8 INJECTION, SOLUTION INTRAMUSCULAR; INTRAVENOUS EVERY 12 HOURS
Status: DISCONTINUED | OUTPATIENT
Start: 2020-01-01 | End: 2020-01-01

## 2020-01-01 RX ORDER — ONDANSETRON 2 MG/ML
8 INJECTION INTRAMUSCULAR; INTRAVENOUS EVERY 8 HOURS PRN
Status: DISCONTINUED | OUTPATIENT
Start: 2020-01-01 | End: 2020-01-01

## 2020-01-01 RX ORDER — HYDROCODONE BITARTRATE AND ACETAMINOPHEN 5; 325 MG/1; MG/1
1 TABLET ORAL EVERY 4 HOURS PRN
Qty: 60 TABLET | Refills: 0 | Status: SHIPPED | OUTPATIENT
Start: 2020-01-01 | End: 2020-01-01

## 2020-01-01 RX ORDER — HYDROCODONE BITARTRATE AND ACETAMINOPHEN 5; 325 MG/1; MG/1
1 TABLET ORAL EVERY 6 HOURS PRN
Qty: 30 TABLET | Refills: 0 | Status: SHIPPED | OUTPATIENT
Start: 2020-01-01 | End: 2020-01-01

## 2020-01-01 RX ORDER — MAGNESIUM CARB/ALUMINUM HYDROX 105-160MG
296 TABLET,CHEWABLE ORAL ONCE
Qty: 296 ML | Refills: 0 | Status: SHIPPED | OUTPATIENT
Start: 2020-01-01 | End: 2020-01-01

## 2020-01-01 RX ORDER — DEXAMETHASONE 4 MG/1
4 TABLET ORAL
Qty: 30 TABLET | Refills: 1 | Status: ON HOLD | OUTPATIENT
Start: 2020-01-01 | End: 2020-01-01

## 2020-01-01 RX ORDER — MORPHINE SULFATE 15 MG/1
15 TABLET, FILM COATED, EXTENDED RELEASE ORAL EVERY 12 HOURS SCHEDULED
Qty: 60 TABLET | Refills: 0 | Status: SHIPPED | OUTPATIENT
Start: 2020-01-01 | End: 2020-01-01

## 2020-01-01 RX ORDER — HYDROMORPHONE HYDROCHLORIDE 1 MG/ML
1 INJECTION, SOLUTION INTRAMUSCULAR; INTRAVENOUS; SUBCUTANEOUS EVERY 2 HOUR PRN
Status: DISCONTINUED | OUTPATIENT
Start: 2020-01-01 | End: 2020-01-01

## 2020-01-01 RX ORDER — KETOROLAC TROMETHAMINE 30 MG/ML
15 INJECTION, SOLUTION INTRAMUSCULAR; INTRAVENOUS ONCE
Status: COMPLETED | OUTPATIENT
Start: 2020-01-01 | End: 2020-01-01

## 2020-01-01 RX ORDER — HYDROMORPHONE HYDROCHLORIDE 1 MG/ML
0.5 INJECTION, SOLUTION INTRAMUSCULAR; INTRAVENOUS; SUBCUTANEOUS
Status: DISCONTINUED | OUTPATIENT
Start: 2020-01-01 | End: 2020-01-01

## 2020-01-01 RX ORDER — NALOXONE HYDROCHLORIDE 0.4 MG/ML
0.08 INJECTION, SOLUTION INTRAMUSCULAR; INTRAVENOUS; SUBCUTANEOUS
Status: DISCONTINUED | OUTPATIENT
Start: 2020-01-01 | End: 2020-01-01

## 2020-01-01 RX ORDER — METOCLOPRAMIDE HYDROCHLORIDE 5 MG/ML
5 INJECTION INTRAMUSCULAR; INTRAVENOUS EVERY 8 HOURS PRN
Status: DISCONTINUED | OUTPATIENT
Start: 2020-01-01 | End: 2020-01-01

## 2020-01-01 RX ORDER — MORPHINE SULFATE 4 MG/ML
4 INJECTION, SOLUTION INTRAMUSCULAR; INTRAVENOUS EVERY 2 HOUR PRN
Status: DISCONTINUED | OUTPATIENT
Start: 2020-01-01 | End: 2020-01-01

## 2020-01-01 RX ORDER — DIAZEPAM 2 MG/1
2 TABLET ORAL EVERY 8 HOURS PRN
Qty: 90 TABLET | Refills: 3 | Status: SHIPPED | OUTPATIENT
Start: 2020-01-01

## 2020-01-01 RX ORDER — OLANZAPINE 2.5 MG/1
2.5 TABLET ORAL DAILY
Qty: 30 TABLET | Refills: 0 | Status: SHIPPED | OUTPATIENT
Start: 2020-01-01

## 2020-01-03 NOTE — TELEPHONE ENCOUNTER
Sreekanth Olivares MD  You 50 minutes ago (1:37 PM)      I sent Kurt Micah, tell her our oncology does good with pain management, etc, pain meds if she wants to see them

## 2020-01-03 NOTE — TELEPHONE ENCOUNTER
Pt was seen in ER for chronic abdominal pain & vomiting & diarrhea the past 48 hrs.  K+ 2.9. No imaging done. She can not keep her pain meds down. ER gave her IV fluids & IV K+ & Zofran & told her to f/u with PCP.   Do you want pt to f/u in the office to d

## 2020-01-03 NOTE — ED PROVIDER NOTES
Patient Seen in: THE [facility] Emergency Department In [place]      History   Patient presents with:  Nausea/Vomiting/Diarrhea    Stated Complaint: nvdr    HPI    This is a pleasant 78-year-old female with a history of metastatic breast cancer that is termin normal limits   URINALYSIS WITH CULTURE REFLEX - Abnormal; Notable for the following components:    Ketones Urine 15  (*)     Leukocyte Esterase Urine Trace (*)     All other components within normal limits   LIPASE - Abnormal; Notable for the following co Tre 30  [phone]    In 1 week          Medications Prescribed:  Current Discharge Medication List    START taking these medications    !! ondansetron 4 MG Oral Tablet Dispersible  Take 1 tablet (4 mg total) by mouth every 4 (four) hour

## 2020-01-03 NOTE — TELEPHONE ENCOUNTER
Pt notified of below orders. Pt states understanding & has appt next week with pain doctor. All questions answered, pt expresses understanding.

## 2020-01-03 NOTE — TELEPHONE ENCOUNTER
Pt notified of below orders. Appt made for ER f/u with you on 1/9.   She will be following up with her Pain Specialist in a few weeks & is requesting something to be sent in to help her pain, she can not keep down the Medical Marijuana & the pain in her sp

## 2020-01-03 NOTE — ED INITIAL ASSESSMENT (HPI)
Pt complaining of vomiting and diarrhea since this morning. Pt also complaining of chronic back pain.

## 2020-01-06 NOTE — TELEPHONE ENCOUNTER
Please call patient back to schedule an appointment for [facility].  She only has one car now and wants to come in [facility]. Jan 7th at 10:00 am or 11:00

## 2020-01-08 NOTE — PROGRESS NOTES
Palliative Care Follow Up Note     Patient Name: Chris Eastman   YOB: 1965   Medical Record Number: [id number]   [facility]: [id number]   Date of visit: 1/8/2020     Chief Complaint/Reason for Visit:  Palliative follow up     History of Present Illne hyperlipidemia    • Transfusion history      Surgical History:  Past Surgical History:   Procedure Laterality Date   • BREAST PROSTHESIS, SILICONE      0/47/23   • BREAST RECONSTRUCTION SECOND STAGE/ REVISION Bilateral 9/22/2014    Performed by Lurdes Kirk (four) hours as needed for Nausea. 10 tablet 0   • Zolpidem Tartrate 10 MG Oral Tab Take 1 tablet (10 mg total) by mouth nightly. 30 tablet 3   • HYDROcodone-acetaminophen 5-325 MG Oral Tab Take 1 tablet by mouth every 4 (four) hours as needed for Pain.  80 optimize pain control with medications as patient will allow. Impression/Plan:   1. Neoplasm related pain  MS Contin 15mg Q 12  norco 5mg Q 4 prn  Naproxen 500mg BID      Planned Follow up: Return in about 2 weeks (around 1/21/2020).       30 total min

## 2020-01-10 NOTE — TELEPHONE ENCOUNTER
Patient called stating she feels somewhat better since starting morphine but has is having pain flares in the evening into the night. She is using norco 1X daily because she is afraid to mix different drugs.   She feels the morphine is helping and she is

## 2020-01-22 NOTE — TELEPHONE ENCOUNTER
Patient is having severe back and shoulder pain. The pain is most likely from her bone mets. She stopped the naproxen due to nausea. She feels the norco helps but stopped using it due to additional nausea when combined with morphine.    She feels the m

## 2020-01-24 NOTE — TELEPHONE ENCOUNTER
Pt notified of below orders. Pt instructed to go to ER if severe abdominal pain develops, emesis, or not passing gas occurs. All questions answered, pt expresses understanding.

## 2020-01-24 NOTE — TELEPHONE ENCOUNTER
Pt called crying stating she is so constipated & has not had a BM in a month & has nausea. She is staying hydrated & trying to drink Prune juice & it has not helped. Pt saw Hem/Onc & is currently taking prn Norco, Zofran, Morphine, & Decadron.   She decli

## 2020-01-27 NOTE — TELEPHONE ENCOUNTER
Patient called stating morphine is nauseating her and constipating her. She doesn't feel it has been that helpful for pain. Her mobility is limited and she is frustrated about not leaving the house due to pain. She has had no BM in a week.   She is drink

## 2020-01-29 PROBLEM — C50.919 MALIGNANT NEOPLASM OF FEMALE BREAST, UNSPECIFIED ESTROGEN RECEPTOR STATUS, UNSPECIFIED LATERALITY, UNSPECIFIED SITE OF BREAST (HCC): Status: ACTIVE | Noted: 2020-01-01

## 2020-01-29 PROBLEM — C50.919 MALIGNANT NEOPLASM OF FEMALE BREAST (HCC): Status: ACTIVE | Noted: 2020-01-01

## 2020-01-29 PROBLEM — E87.6 HYPOKALEMIA: Status: ACTIVE | Noted: 2020-01-01

## 2020-01-29 PROBLEM — N17.9 ACUTE KIDNEY INJURY (HCC): Status: ACTIVE | Noted: 2020-01-01

## 2020-01-29 PROBLEM — N17.9 ACUTE RENAL FAILURE (ARF) (HCC): Status: ACTIVE | Noted: 2020-01-01

## 2020-01-29 PROBLEM — E83.52 HYPERCALCEMIA: Status: ACTIVE | Noted: 2020-01-01

## 2020-01-29 PROBLEM — D64.9 ANEMIA: Status: ACTIVE | Noted: 2020-01-01

## 2020-01-29 NOTE — H&P
[facility]  History and Physical     Rita Verona Patient Status:  Emergency    9/15/1965 MRN [id number]   Location [facility] Attending Karrin Lefort, MD   Hosp Day # 0 PCP Nano Marie MD     Chief Comp drugs.     Family History:   Family History   Adopted: Yes   Problem Relation Age of Onset   • Heart Disorder Mother    • Other (Other[other]) Mother    • Other (mi[other]) Mother        Allergies:   Ativan [Lorazepam]      NAUSEA AND VOMITING  Tramadol membranes. EOM-I. PERRLA. Anicteric. Neck: No lymphadenopathy. No JVD. Derek Roughen Respiratory: Clear to auscultation bilaterally. No wheezes. No rhonchi. Cardiovascular: S1, S2. Regular rate and rhythm. No murmurs, rubs or gallops. Equal pulses.    Chest and Back Heparin  · CODE status: Full  · Salazar: no    Plan of care discussed with patient and RN    Joselin Conrad,   1/29/2020

## 2020-01-29 NOTE — ED INITIAL ASSESSMENT (HPI)
Pt states she has had abd pain for the last 6 weeks, pt states she has not had a bowel movement in the last 6 weeks. Vomiting started yesterday twice.

## 2020-01-29 NOTE — ED NOTES
Pt having burning to r arm where potassium is infusing. Placed an ice bag on r arm and slowed rate. 0.9ns 1L infusing.

## 2020-01-29 NOTE — ED PROVIDER NOTES
Patient Seen in: THE [facility] Emergency Department In [place]      History   Patient presents with:  Nausea/Vomiting/Diarrhea  Back Pain    Stated Complaint: vomiting    HPI    Patient is a 11-year-old female comes emergency room for evaluation of constipat (Room air)       Current:/78   Pulse 95   Temp 97.6 °F (36.4 °C) (Oral)   Resp 18   Ht 149.9 cm (4' 11\")   Wt 45.4 kg   SpO2 100%   BMI 20.20 kg/m²         Physical Exam    GENERAL: No acute distress, well appearing and non-toxic, Alert and oriented Urine 30 mg/dL (*)     Leukocyte Esterase Urine Moderate (*)     All other components within normal limits   CBC W/ DIFFERENTIAL - Abnormal; Notable for the following components:    RBC 3.28 (*)     HGB 10.1 (*)     HCT 32.2 (*)     RDW 19.6 (*)     RDW-SD disease throughout visualized osseous structures. Findings most consistent with degenerative change as above. Please refer to recent CT of the abdomen and pelvis for further details.    Dictated by: Snehal Harry MD on 1/29/2020 at 14:41     Approved by: Jasbir Chiu hernia. URINARY BLADDER:  No visible focal wall thickening, lesion, or calculus. PELVIC NODES:  No adenopathy. PELVIC ORGANS:  No visible mass. Pelvic organs appropriate for patient age.   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 Laboratory test significant for hypercalcemia. Given IV fluids for this. QT C interval normal on EKG. Patient will be placed in a monitored setting.   Increase in

## 2020-01-30 PROBLEM — R52 INTRACTABLE PAIN: Status: ACTIVE | Noted: 2020-01-01

## 2020-01-30 PROBLEM — R11.2 INTRACTABLE VOMITING WITH NAUSEA: Status: ACTIVE | Noted: 2020-01-01

## 2020-01-30 PROBLEM — D64.9 ANEMIA, UNSPECIFIED: Status: ACTIVE | Noted: 2020-01-01

## 2020-01-30 PROBLEM — G89.3 CHRONIC PAIN DUE TO MALIGNANT NEOPLASTIC DISEASE: Status: ACTIVE | Noted: 2020-01-01

## 2020-01-30 PROBLEM — R53.1 WEAKNESS: Status: ACTIVE | Noted: 2020-01-01

## 2020-01-30 PROBLEM — C50.919 METASTATIC BREAST CANCER (HCC): Status: ACTIVE | Noted: 2020-01-01

## 2020-01-30 PROBLEM — C79.51 BONE METASTASES: Status: ACTIVE | Noted: 2020-01-01

## 2020-01-30 PROBLEM — E86.0 DEHYDRATION: Status: ACTIVE | Noted: 2020-01-01

## 2020-01-30 PROBLEM — C50.919 METASTATIC BREAST CANCER: Status: ACTIVE | Noted: 2020-01-01

## 2020-01-30 PROBLEM — C79.51 BONE METASTASES (HCC): Status: ACTIVE | Noted: 2020-01-01

## 2020-01-30 NOTE — PAYOR COMM NOTE
--------------  CONTINUED STAY REVIEW    Payor: [employer]  Subscriber #:  [id number]  Authorization Number: [id number]    Admit date: 1/29/20  Admit time: [address]    Admitting Physician: Tom Pompa MD  Attending Physician:  Sam Mcmahan MD  Primary Clearance: 34.2 mL/min (A) (based on SCr of 1.39 mg/dL (H)).     No results for input(s): PTP, INR in the last 168 hours.     No results for input(s): TROP, CK in the last 168 hours.           Lab Results   Component Value Date     TSH 1.220 01/29/2020

## 2020-01-30 NOTE — PLAN OF CARE
Pt alert and oriented x4. Pt anxious about pain, MD notified and new orders to change pain medication from morphine to dialudid received. Pt given reglan and zofran for nausea. No emesis overnight. Pt with urinary frequency through the night.   VSS and a

## 2020-01-30 NOTE — PROGRESS NOTES
[facility]  Progress Note     Isabella Au Patient Status:  Inpatient    9/15/1965 MRN [id number]   [facility] 4NW-A Attending Genet Correa MD   Hosp Day # 1 PCP Tahir Frost MD     Chief Complaint: pain, constipation    S 168 hours. No results for input(s): TROP, CK in the last 168 hours. Lab Results   Component Value Date    TSH 1.220 01/29/2020    T4F 1.0 01/29/2020       Imaging: Imaging data reviewed in Epic.     Medications:   • pamidronate (AREDIA) IVPB  90 mg In

## 2020-01-30 NOTE — CONSULTS
[facility]  Report of Consultation    Emilio Ram Patient Status:  Inpatient    9/15/1965 MRN [id number]   [facility] 4NW-A Attending Bianca Becerra MD   Hosp Day # 1 PCP Faustina Colon MD     Reason for Consultation:    [facility] hyperlipidemia    • Transfusion history      Past Surgical History:   Procedure Laterality Date   • BREAST PROSTHESIS, SILICONE      0/61/02   • BREAST RECONSTRUCTION SECOND STAGE/ REVISION Bilateral 9/22/2014    Performed by Viraj Hale MD at [address] [place] [address] 01/30/2020    CREATSERUM 1.55 01/29/2020    BUN 22 01/29/2020     01/29/2020    K 3.4 01/29/2020     01/29/2020    CO2 24.0 01/29/2020    GLU 68 01/29/2020    CA 12.8 01/29/2020    ALB 3.5 01/29/2020    ALKPHO 141 01/29/2020    BILT 0.5 01/29/2 treatment. She has done sporadic imaging since then but has persistently refused any treatment including endocrine therapy. I last saw her 2/19 and she has seen palliative care sporadically at cancer center.  Not receptive to discussion today as she is ve

## 2020-01-30 NOTE — PLAN OF CARE
Assumed care in AM, persistant nausea , w/o emesis, c/o generalized pain, stomach , back, shoulders , weakness , states last BM was 12/25, has not eaten in \"days\", IV fluids at 150 cc/hr DR Ashu Duarte in , DR Marti in , Palliative care in , medications

## 2020-01-30 NOTE — DIETARY NOTE
[address] ASSESSMENT    Pt does not meet malnutrition criteria at this time.      NUTRITION DIAGNOSIS/PROBLEM:    Inadequate energy intake related to decreased ability to consume sufficient energy intake as evidenced by nausea, co No  Cultural/Ethnic/Holiness Preferences Addresses: Yes    NUTRITION RELATED PHYSICAL FINDINGS:   Unable to conduct nutrition focused physical exam at this time.      NUTRITION PRESCRIPTION:47.4 kg  Calories: 5950-8991 calories/day (30-32 calories per kg)

## 2020-01-30 NOTE — PROGRESS NOTES
Pharmacy renal dose adjustment for metoclopramide (Reglan)    Gutierrez Florez has been prescribed metoclopramide 10 mg every 8 hours as needed for nausea/vomiting. Estimated Creatinine Clearance: 24.9 mL/min (A) (based on SCr of 1.83 mg/dL (H)).     The e

## 2020-01-30 NOTE — CONSULTS
[facility]    PATIENT'S NAME: Miguel Angel Bell   ATTENDING PHYSICIAN: Emily Luther MD   CONSULTING PHYSICIAN: Michelle Lindsay M.D.    PATIENT ACCOUNT#:   [de-identified]    LOCATION:  [address] [place] [address]  MEDICAL RECORD #:   [id number]       DATE OF BIRTH:  [birth year] all 4 extremities equally. No cyanosis, clubbing, or edema. SKIN:  Dry. NEUROLOGIC:  Answering questions appropriately. PSYCHIATRIC:  In mild distress due to pain. LABORATORY DATA:  As per HPI.     IMPRESSION:  A 40-year-old woman with metastatic christal

## 2020-01-30 NOTE — PALLIATIVE CARE NOTE
Palliative Care Consult request from [address] [facility] noted requesting discussion for goals of care, advanced care planning and symptom management. Patient has been following with Patricia Coello NP, OP Palliative for symptom management.  Chart reviewed and p

## 2020-01-30 NOTE — PAYOR COMM NOTE
--------------  ADMISSION REVIEW     Payor: [employer] [facility]  Subscriber #:  [id number]  Authorization Number: [id number]    Admit date: 1/29/20  Admit time: [address]       Admitting Physician: Ashley Rothman MD  Attending Physician:  Hailey Hylton MD  Primary C BMI 20.20 kg/m²          Physical Exam    GENERAL: No acute distress, well appearing and non-toxic, Alert and oriented X 3   HEENT: Normocephalic, atraumatic. Moist mucous membranes.   Pupils equal round reactive to light and accommodation, extraocular m Notable for the following components:    RBC 3.28 (*)     HGB 10.1 (*)     HCT 32.2 (*)     RDW 19.6 (*)     RDW-SD 71.7 (*)     All other components within normal limits   LIPASE - Normal   CBC WITH DIFFERENTIAL WITH PLATELET    Narrative:      The followi the abdomen and pelvis for further details.    Dictated by: Mindi Hazel MD on 1/29/2020 at 14:41     Approved by: Mindi Hazel MD on 1/29/2020 at 14:44          Ct Abdomen Pelvis Iv Contrast, No Oral (er)    Result Date: 1/29/2020  PROCEDURE:  CT ABDOMEN PELVI mass.  Pelvic organs appropriate for patient age. 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   There is a new compression fracture of T10 with 50% loss of pelvis, spine, femur. Patient with T10 compression fracture. Admit to hospital.  Monitor for arrhythmia. Case discussed with hospitalist, Dr. Falguni Valencia. Labs also reveal hypokalemia and IV potassium given here. Morphine for pain.   Normal neurologic exam who presented initially to the emergency department with chief complaint of constipation. Patient has a history of breast cancer which was treated with a bilateral mastectomy, her breast cancer recurred in 2018 she reports.   She has been seen by oncolog Clearance: 24.9 mL/min (A) (based on SCr of 1.83 mg/dL (H)). No results for input(s): PTP, INR in the last 168 hours. No results for input(s): TROP, CK in the last 168 hours. Imaging: Imaging data reviewed in Epic. ASSESSMENT / PLAN:     1.  H RN      iohexol (OMNIPAQUE) 350 MG/ML injection 100 mL     Date Action Dose Route User    1/29/2020 1353 Given 53 mL Intravenous Meera Reich      ketorolac tromethamine (TORADOL) 30 MG/ML injection 15 mg     Date Action Dose Route User    1/29/2020 1 Rate/Dose Change (none) Intravenous Nitin Medina RN    1/29/2020 1421 New Bag 20 mEq Intravenous Diann GOTTI RN      Senna-Docusate Sodium (SENOKOT S) 8.6-50 MG tab 2 tablet     Date Action Dose Route User    1/30/2020 0007 Given 2 tablet Or

## 2020-01-31 NOTE — CONSULTS
Kyle  [id number]  Hospital Day #2  Date of Consult: 01/31/20  Patient seen at: [facility]    Reason for Consultation:      Consult requested by Rogerio RODRIGUEZ for evaluation Review of Systems:   Palliative Care symptom needs assessed:      Symptoms(s): constipation;pain;fatigue;nausea  Dyspnea: denies  Cough: denies  Nausea: present, but tolerating some CLD; no emesis  Pain: Consistently rating back and L shoulder at 8-10/ VOMITING  Tramadol                NAUSEA AND VOMITING  Penicillins             OTHER (SEE COMMENTS)    Comment:VOMITING AND DIARRHEA    Medications:   Complete list reviewed.    HYDROmorphone (DILAUDID) 0.2 mg/ml PCA infusion, , Intravenous, Continuous  HYD Imaging:  Xr Lumbar Spine (min 4 Views) (cpt=72110)    Result Date: 1/29/2020  CONCLUSION:  1. Marked osteoblastic disease throughout visualized osseous structures. Findings most consistent with degenerative change as above.   Please refer to recent warm and dry.     Palliative Performance Scale : 60%    Palliative Performance Scale   % Ambulation Activity Level Self-Care Intake Consciousness   100 Full Normal Full   Normal Full   90 Full No disease  Normal Full Normal Full   80 Full Some disease  Norm life-prolonging measures and treatments  3) Focus on complete and total comfort care  4) Must agree to sign on hospice benefit to receive services     Hospice services:  1) Phone services 24/7 (they will be your 911 at all hours when symptoms flare)  2) In Active Problem List:     Hyperlipemia     History of breast cancer     Anxiety     Screening for lipoid disorders     Encounter for vitamin deficiency screening     Screening for thyroid disorder     Screening for other and unspecified endocrine, nutrition OK to receive q48hrs based on kidney function per pharmacist.    A total of 50 minutes were spent on this consult; >50% was spent counseling and coordinating care. Thank you for allowing the Palliative Care Team to participate in the care of your patient.

## 2020-01-31 NOTE — PAYOR COMM NOTE
--------------  CONTINUED STAY REVIEW    Payor: [facility] PP  Subscriber #:  [id number]  Authorization Number: [id number]    Admit date: 1/29/20  Admit time: [address]    Admitting Physician: Deirdre Vázquez MD  Attending Physician:  Troy Dye MD  Primary 22   BILT 0.5  --  0.4 0.4   TP 8.8*  --  7.0 6.7         Estimated Creatinine Clearance: 49.5 mL/min (based on SCr of 0.96 mg/dL).     No results for input(s): PTP, INR in the last 168 hours.     No results for input(s): TROP, CK in the last 168 hours.

## 2020-01-31 NOTE — PLAN OF CARE
Awake & alert. Patient agreed to dilaudid PCA continuous so medication was started,tolerating well. Patient kept on close monitoring for falls,assisted to the bathroom,fall precautions observed. Patient earlier this shift has told RN that RN is not suppose t

## 2020-01-31 NOTE — HOSPICE RN NOTE
Hospice referral received, ECIN sent. Spoke to pt, she requested mtg with hospice tomorrow at 3p due to 's schedule. Stella Hernández, aware.

## 2020-01-31 NOTE — DIETARY MALNUTRITION NOTE
[facility] ASSESSMENT    Pt meets moderate malnutrition criteria at this time.      CRITERIA FOR MALNUTRITION DIAGNOSIS:  Criteria for non-severe malnutrition diagnosis: chronic illness related to wt loss 20% in 1 year, energy in hrs:   Weight   01/29/20 1258 45.4 kg (100 lb)   01/29/20 1759 47.4 kg (104 lb 6.4 oz)     Wt Readings from Last 10 Encounters:  01/29/20 : 47.4 kg (104 lb 6.4 oz)  01/07/20 : 51.1 kg (112 lb 9.6 oz)  01/03/20 : 47.6 kg (105 lb)  10/03/19 : 52.2 kg (115 lb

## 2020-01-31 NOTE — PLAN OF CARE
Patient received alert and oriented. Ambulating to the bathroom at least once an hour throughout the night, small amounts of urine at a time.     Prn pain medication given frequently throughout the night. (please refer to mar) patient consistently rating resources and transportation as appropriate  - Identify discharge learning needs (meds, wound care, etc)  - Arrange for interpreters to assist at discharge as needed  - Consider post-discharge preferences of patient/family/discharge partner  - Complete EMILIA

## 2020-01-31 NOTE — PROGRESS NOTES
[facility]  Progress Note    Heather Alan Patient Status:  Inpatient    9/15/1965 MRN [id number]   [facility] 4NW-A Attending Johnathan Matos MD   Hosp Day # 2 PCP Anshu Pugh MD       SUBJECTIVE:    Continues to have intractab HYDROmorphone (DILAUDID) PCA bolus from pump, Naloxone HCl, Nalbuphine HCl, diphenhydrAMINE HCl, diazepam, Metoclopramide HCl    PHYSICAL EXAM:    General: Patient is awake, in distress due to pain. Able to answer questions. Chest: Clear anteriorly. Discussed. Told her to discuss with her  once she has established her goals of care.     A total of 45 minutes were spent  with the patient during this encounter and over  50% of that time  was spent face-to-face on counseling and coordination of ca

## 2020-01-31 NOTE — CONSULTS
[facility] Pharmacy Note:  Pain Consult    Jimena Linares is a 53year old female started on Dilaudid PCA by Dr. Tia Estrella. Pharmacy was consulted to review medication profile and to discontinue previously ordered narcotics and sedatives.     Medication p

## 2020-02-01 NOTE — PROGRESS NOTES
[facility]  Progress Note     Evan Holland Patient Status:  Inpatient    9/15/1965 MRN [id number]   [facility] 4NW-A Attending Argentina Watkins MD   Hosp Day # 3 PCP Gaudencio Hughes MD     Chief Complaint: pain, constipation    S 0.4 0.4  --    TP 8.8*  --  7.0 6.7  --        Estimated Creatinine Clearance: 48.8 mL/min (based on SCr of 0.96 mg/dL). No results for input(s): PTP, INR in the last 168 hours. No results for input(s): TROP, CK in the last 168 hours.          Imaging

## 2020-02-01 NOTE — PLAN OF CARE
Assumed patient care at 0730. Vital signs stable. Patient alert and oriented x 4. Patient on PCA pump for pain control. Repositioned frequently for comfort. Plans for a hospice meeting with her  this afternoon.       Problem: PAIN - ADULT  Goal: discharge learning needs (meds, wound care, etc)  - Arrange for interpreters to assist at discharge as needed  - Consider post-discharge preferences of patient/family/discharge partner  - Complete POLST form as appropriate  - Assess patient's ability to be

## 2020-02-01 NOTE — PLAN OF CARE
Assumed care of patient at 1200. Pt. A&O x4. VSS. Pt. C/o nausea; scheduled zofran given with relief. Pt. On PCA dilaudid; stating pain is under control. Pt. Able to ambulate to the bathroom with standby assist. Fall precautions in place.  Call light within discharge resources and transportation as appropriate  - Identify discharge learning needs (meds, wound care, etc)  - Arrange for interpreters to assist at discharge as needed  - Consider post-discharge preferences of patient/family/discharge partner  - Co

## 2020-02-01 NOTE — HOSPICE RN NOTE
Met with Isaiah Swain and her  Leann Stubbs. Hospice philosophy and benefit reviewed. Isaiah Swain was talking about lab draws and calcium levels requiring intervention to reduce levels and potential other testing.   This was after aggressive/curative vs focus on comfort

## 2020-02-02 NOTE — HOSPICE RN NOTE
Stopped by for follow up and Amandeep Sheth is up in recliner, on phone. She has no questions for hospice at present.

## 2020-02-02 NOTE — PROGRESS NOTES
[facility]  Progress Note     Adrienne Roles Patient Status:  Inpatient    9/15/1965 MRN [id number]   [facility] 4NW-A Attending Kitty Pablo MD   Hosp Day # 4 PCP Melisa Romero MD     Chief Complaint: pain, constipation    S 141*  --  109* 98  --    AST 71*  --  49* 49*  --    ALT 36  --  27 22  --    BILT 0.5  --  0.4 0.4  --    TP 8.8*  --  7.0 6.7  --        Estimated Creatinine Clearance: 48.8 mL/min (based on SCr of 0.96 mg/dL).     No results for input(s): PTP, INR in the

## 2020-02-03 NOTE — PROGRESS NOTES
[address] Follow Up    Gutierrez Florez  [id number]  Patient seen at: [facility]:      States she wants to go home, has called the police and states she has been tape recording conversations with the nurses Date    WBC 5.8 01/30/2020    HGB 8.3 (L) 01/30/2020    HCT 26.7 (L) 01/30/2020    .0 (L) 01/30/2020       Coags:No results found for: PT, INR, PTT    Chemistry:  Lab Results   Component Value Date    CREATSERUM 0.96 01/31/2020    BUN 14 01/31/2020 Extensive Disease  Can't do any work Max Assist  Total Care Minimal Drowsy/confused   10 Bedbound/coma Extensive Disease  Can't do any work  coma  Max Assist  Total Care Mouth care Drowsy or coma   0 Death       Psychosocial: Emotional support provided[place]. [place] prescribed Morphine ER #60 tabs on 1/7/2020 by Alison Goldstein NP Palliative care and Ramiro Flores states they still have them at home. She also had diazepam 2mg #90 tabs prescribed on 1/72020 by Dr. Joe Hills.      Maribell in advance care planning and goals of care      Palliative Care Recommendations/Plan:     1. Goals of care: Patient refusing ongoing treatment and work up for cancer. States she just wants to go home.  She can f/u with Kristine Guerra NP OP PC but no plan was set du

## 2020-02-03 NOTE — PAYOR COMM NOTE
--------------  CONTINUED STAY REVIEW    Payor: [employer]  Subscriber #:  [id number]  Authorization Number: [id number]    Admit date: 1/29/20  Admit time: [address]    Admitting Physician: Vannesa Adams MD  Attending Physician:  Argentina Watkins MD  Primary 3350  17 g Oral Daily   • Senna-Docusate Sodium  2 tablet Oral Daily      HYDROmorphone (DILAUDID) PCA bolus from pump, Naloxone HCl, Nalbuphine HCl, diphenhydrAMINE HCl, diazepam, Metoclopramide HCl     PHYSICAL EXAM:     General: Patient is awake, in dis subjective.     Vital signs:  Temp:  [97.6 °F (36.4 °C)-98.2 °F (36.8 °C)] 98.2 °F (36.8 °C)  Pulse:  [] 104  Resp:  [13-18] 13  BP: (111-146)/(58-80) 142/78     Wt Readings from Last 6 Encounters:  02/01/20 : 103 lb (46.7 kg)  01/07/20 : 112 lb 9.6 Before breakfast   • ondansetron HCl  8 mg Intravenous Q8H [facility]   • Heparin Sodium (Porcine)  5,000 Units Subcutaneous Q8H [facility]   • PEG 3350  17 g Oral Daily   • Senna-Docusate Sodium  2 tablet Oral Daily         ASSESSMENT / PLAN:      1.  Hypercalemia of ma today     Quality:  · DVT Prophylaxis: HSQ  · CODE status: DNR  · Salazar: none  · Central line: none     Will the patient be referred to [facility] on discharge?: no  Estimated date of discharge: today? Discharge is dependent on: progress  At this point MsJuanpablo  [employer]

## 2020-02-03 NOTE — PROGRESS NOTES
[facility]  Progress Note     Isabella Au Patient Status:  Inpatient    9/15/1965 MRN [id number]   [facility] 4NW-A Attending Genet Correa MD   Hosp Day # 5 PCP Tahir Frost MD     Chief Complaint: pain, constipation    S --  0.4 0.4  --    TP 8.8*  --  7.0 6.7  --        Estimated Creatinine Clearance: 52.3 mL/min (based on SCr of 0.96 mg/dL). No results for input(s): PTP, INR in the last 168 hours. No results for input(s): TROP, CK in the last 168 hours.          Phong Quevedo

## 2020-02-03 NOTE — TELEPHONE ENCOUNTER
I attempted to call pt, no answer. No VM. Pt currently in-patient at [facility] & per RN's documentation, pt is confused & on PCA. Will await pt call back.

## 2020-02-03 NOTE — PLAN OF CARE
Alert and oriented x4. VSS. Afebrile. C/o of pain even with dilaudid PCA. Experiecing nausea. Scheduled zofrana nd prn reglan given. Up to the bathroom frequently. Multiple bruises on stomach from heparin injections. Hoping to go home today.  Not sleeping m

## 2020-02-03 NOTE — DISCHARGE SUMMARY
[facility]  DISCHARGE SUMMARY     Nevin Soto Patient Status:  Inpatient    9/15/1965 MRN [id number]   [facility] 4NW-A Attending Harini Damon MD   [facility] Day # 5 PCP Kurt Mendez MD     Date of Admission: 2020  Date o descriptions):  • none    Lab/Test results pending at Discharge:   · none    Consultants:  • Oncology, Hospice, Endocrine    Discharge Medication List:     Discharge Medications      START taking these medications      Instructions Prescription details   O Eric, [phone], [phone]  [address] [facility] [address]    Phone:  [phone]   · OLANZapine 2.5 MG Tabs         [place]    Follow-up appointment:   No follow-up provider specified.   Appointments for Next 30 Days 2/4/20

## 2020-02-03 NOTE — PROGRESS NOTES
[facility]  Progress Note    Isabella Au Patient Status:  Inpatient    9/15/1965 MRN [id number]   [facility] 4NW-A Attending Genet Correa MD   Hosp Day # 5 PCP Tahir Frost MD       SUBJECTIVE:    More comfortable.  Wants to Nalbuphine HCl, diphenhydrAMINE HCl, diazepam, Metoclopramide HCl    PHYSICAL EXAM:    General: Patient is awake, in distress due to pain. Able to answer questions. Chest: Clear anteriorly. Heart: Regular rate and rhythm. Abdomen: Not examined.   Extre

## 2020-02-03 NOTE — PROGRESS NOTES
Assumed care in AM , pleasant , states \"cancer dr just told her she could go home \", afebrile , appetite poor using PCA , states she will go home today , DR Caitlin Chilel in , [phone]; somewhat confused , requested RN to leave room , suspicious , accuses RN of

## 2020-02-03 NOTE — PLAN OF CARE
Assumed care of patient, able to state  name and birthdate, patient very suspicious of staff, and asked this nurse to leave the room.  Patient on telemetry sinus tachycardia rate 120-140, o2 saturation in upper 90's, rates back pain at a number 7   Received

## 2020-02-03 NOTE — CM/SW NOTE
Met w/pt per RN request. Pt crying and very upset at this time. Pt crying and is confused stating her MD had a stroke. She also stated she was being kicked out. Informed her that is not the case. Pt very tearful. Tried to comfort her.  At this time pt state

## 2020-02-03 NOTE — PLAN OF CARE
Pt left AMA.  Pt is very suspicious and calling the [facility] police department and claims people are stealing and was very adomant about leaving the hospital. Requested pt's  to sign AMA but pt would not let the  signed, wants to read AMA pa

## 2020-02-03 NOTE — TELEPHONE ENCOUNTER
Pt called, states is admitted at [facility] and \"in danger. \" Pt started crying and states that the police are involved and they won't let her  come see her.  Pt states that she is scared and needs to get out of the hospital. She is wanting t

## 2020-02-03 NOTE — PROGRESS NOTES
Pt left AMA. Attempted to talk to her however pt was adamant about leaving. She was disconnected from her PCA and advised not to drive, she was picked up by her  and walked out on her own. She refused to sign AMA paperwork and ripped it up.

## 2020-02-04 NOTE — PAYOR COMM NOTE
--------------  DISCHARGE REVIEW    Payor: [employer]  Subscriber #:  [id number]  Authorization Number: [id number]    Admit date: 1/29/20  Admit time:  4800  Discharge Date: 2/3/2020  2:35 PM     Admitting Physician: Janette Traylor MD  Attending Physician: Sodium (SENOKOT S) 8.6-50 MG tab 2 tablet, 2 tablet, Oral, Daily  [START ON 2/4/2020] OLANZapine 2.5 MG Oral Tab, Take 1 tablet (2.5 mg total) by mouth daily.        Labs/ imaging      Hematology:  Lab Results   Component Value Date     WBC 5.8 01/30/2020 Partial Assist Normal or Reduced Full or confused   40 Mainly in bed Extensive Disease  Can't do any work Mainly Assist Normal or Reduced Full or confused   30 Bedbound Extensive Disease  Can't do any work Max Assist  Total Care Reduced Drowsy/confused   2 her to stay to create a safe DC plan. Dr. Halle Garcia here to assess and offer Rx's for discharge but patient and  refusing to wait.  RN charge reviewed AMA document with patient and  and was asking  to sign but patient ripped up the doc Bone metastases (HCC)     Intractable pain     Chronic pain due to malignant neoplastic disease     Intractable vomiting with nausea     Weakness     Dehydration     Metastatic breast cancer (HCC)     Anemia, unspecified     Palliative care encounter     C

## 2020-02-04 NOTE — PROGRESS NOTES
Sw called and left voice message for spouse with a request for a call back about [place] paperwork. Sw received return call and completed paperwork for intermittent leave starting on 1/29/202 at request. Elizabeth Bettencourt was faxed to Jeremiah at [phone].

## 2020-02-14 NOTE — TELEPHONE ENCOUNTER
Pt called, she would like to speak with Chica. Pt did not wish to leave any information with me and asks if Maggie Quintero will please call her back. Thank you!

## 2020-02-14 NOTE — TELEPHONE ENCOUNTER
Pt called back asking if I could contact [facility], she was trying to refill her Diazepam & the pharmacy said that it was \"pending or on hold\".   Pt then began to thank me for all I have done for her & that she wanted to say good-bye & thank me for all I've don

## 2020-02-21 NOTE — TELEPHONE ENCOUNTER
Received call from Kush Shirley  from [facility]. Call transferred to [employer].

## 2020-02-21 NOTE — TELEPHONE ENCOUNTER
Spoke to ABIEL CHRISTIANSON [facility]  and she just confirmed pt's last OV with Dr. Phil Mendiola which was 7/15/19 and wanted to confirm the weight, height and BP from that OV. She discussed that she has tried to contact the patient but has not been successful.   She then

## 2020-04-27 NOTE — TELEPHONE ENCOUNTER
Pt's spouse Gael Johns called and was asking if our office could give him the  information for his wife and explained that he is not on the HIPPA so would not be able to release any information to him.     He said that the provider that pt sees now accor

## 2020-05-20 NOTE — TELEPHONE ENCOUNTER
Received a call from spouse Fabiola Horn said that patient passed away this morning, and that he said to tell the doctor thank you for everything. Spouse also said that the patient really liked her doctor and thought that she was great.

## 2020-05-21 ENCOUNTER — MED REC SCAN ONLY (OUTPATIENT)
Dept: FAMILY MEDICINE CLINIC | Facility: CLINIC | Age: 55
End: 2020-05-21

## 2020-05-22 ENCOUNTER — TELEPHONE (OUTPATIENT)
Dept: FAMILY MEDICINE CLINIC | Facility: CLINIC | Age: 55
End: 2020-05-22

## 2023-05-04 NOTE — PLAN OF CARE
Upon entering patient room,  at bedside, and asked this nurse if I mentioned the name Uzma to patient, and I stated no. \" Apparently patient hasnt spoken to daughter for a couple of years, according  to  the name makes her upset. Hemostasis: Electrocautery

## (undated) NOTE — LETTER
10/24/18        [phone]      Dear Iven Barthel,    [facility] records indicate that you have outstanding lab work and or testing that was ordered for you and has not yet been completed:  Orders Placed This Encounter      Scre

## (undated) NOTE — ED AVS SNAPSHOT
Evan Holland   MRN: [id number]    Department:  [facility] Emergency Department in [place]   Date of Visit:  11/27/2018           Disclosure     Insurance plans vary and the physician(s) referred by the ER may not be covered by your plan.  Please contact tell this physician (or your personal doctor if your instructions are to return to your personal doctor) about any new or lasting problems. The primary care or specialist physician will see patients referred from the [facility] Emergency Department.  Kathy Randall

## (undated) NOTE — MR AVS SNAPSHOT
[address] [address]  [phone]               Thank you for choosing us for your health care visit with Jay Rizo MD.  We are glad to serve you and happy to provide you with this summary of Don’t eat while when you’re bored.      EAT THESE FOODS MORE OFTEN: EAT THESE FOODS LESS OFTEN:   Make half your plate fruits and vegetables Highly refined, white starches including white bread, rice and pasta   Eat plenty of protein, keep the fat content l

## (undated) NOTE — MR AVS SNAPSHOT
[address] [address]  [phone]               Thank you for choosing us for your health care visit with Gianni Dhillon MD.  We are glad to serve you and happy to provide you with this summary of Your physician has ordered a radiology test that may require authorization from your insurance company.   Your physician or the clinic staff will work with your insurance company to obtain this authorization for your ordered radiology test.    To schedule a S. On license of [facility] [address], [phone], [address] On license of [facility] [address]     Phone:  [phone]    - diazepam 10 MG Tabs      Information about where to get these medications is not yet available     !  Ask your nu

## (undated) NOTE — ED AVS SNAPSHOT
Rea Benites   MRN: [id number]    Department:  [facility] Emergency Department in [place]   Date of Visit:  1/3/2020           Disclosure     Insurance plans vary and the physician(s) referred by the ER may not be covered by your plan.  Please contact y tell this physician (or your personal doctor if your instructions are to return to your personal doctor) about any new or lasting problems. The primary care or specialist physician will see patients referred from the [facility] Emergency Department.  Salvadore Skiff

## (undated) NOTE — ED AVS SNAPSHOT
Patrice Stephens   MRN: [id number]    Department:  [facility] Emergency Department in [place]   Date of Visit:  10/3/2019           Disclosure     Insurance plans vary and the physician(s) referred by the ER may not be covered by your plan.  Please contact tell this physician (or your personal doctor if your instructions are to return to your personal doctor) about any new or lasting problems. The primary care or specialist physician will see patients referred from the [facility] Emergency Department.  Yoli Oh